# Patient Record
Sex: MALE | Race: BLACK OR AFRICAN AMERICAN | NOT HISPANIC OR LATINO | Employment: FULL TIME | URBAN - METROPOLITAN AREA
[De-identification: names, ages, dates, MRNs, and addresses within clinical notes are randomized per-mention and may not be internally consistent; named-entity substitution may affect disease eponyms.]

---

## 2017-07-10 ENCOUNTER — OFFICE VISIT (OUTPATIENT)
Dept: INTERNAL MEDICINE | Facility: CLINIC | Age: 42
End: 2017-07-10

## 2017-07-10 VITALS
TEMPERATURE: 96.6 F | BODY MASS INDEX: 39.55 KG/M2 | WEIGHT: 267 LBS | SYSTOLIC BLOOD PRESSURE: 120 MMHG | RESPIRATION RATE: 16 BRPM | HEIGHT: 69 IN | HEART RATE: 85 BPM | DIASTOLIC BLOOD PRESSURE: 80 MMHG | OXYGEN SATURATION: 98 %

## 2017-07-10 DIAGNOSIS — R73.02 IMPAIRED GLUCOSE TOLERANCE: ICD-10-CM

## 2017-07-10 DIAGNOSIS — E66.09 NON MORBID OBESITY DUE TO EXCESS CALORIES: ICD-10-CM

## 2017-07-10 DIAGNOSIS — E03.9 ACQUIRED HYPOTHYROIDISM: Primary | ICD-10-CM

## 2017-07-10 PROCEDURE — 99213 OFFICE O/P EST LOW 20 MIN: CPT | Performed by: INTERNAL MEDICINE

## 2017-07-10 NOTE — PROGRESS NOTES
Karsten Urias is a 41 y.o. male.   Visit date not found  Wt Readings from Last 1 Encounters:   07/10/17 267 lb (121 kg)   He was last seen November 2013, weight 231 then, now 267.    He returns for follow-up related to obesity, thyroid problems, impaired glucose tolerance    History of Present Illness   Patient has had problems with his weight chronically.  In April 2011 his weight was 273.  He lost weight down to 231 having just healthier diet and exercising regularly.  His getting back into the 280s and he is now involved in a weight reduction program with low calorie intake.  Laboratory studies done as a part of that recently showed TSH of 5.5 one, uric acid level of 8.9 and he had lipid study showing total cholesterol 193, triglyceride of 49 and .  He is known to have mild impaired glucose tolerance with previous hemoglobin A1c 5.7%.  There is family history of diabetes in his father and the paternal side of the family.  The following portions of the patient's history were reviewed and updated as appropriate: allergies, current medications, past family history, past medical history, past social history, past surgical history and problem list.    Review of Systems   Constitutional: Negative.    HENT: Negative.    Eyes: Negative.    Respiratory: Negative.    Cardiovascular: Negative.    Endocrine: Negative.    Genitourinary: Negative.    Skin: Negative.    Neurological: Negative.    Hematological: Negative.    Psychiatric/Behavioral: Negative.        Objective   Physical Exam   Constitutional: He appears well-developed and well-nourished.   HENT:   Head: Normocephalic and atraumatic.   Cardiovascular: Normal rate and regular rhythm.  Exam reveals no gallop and no friction rub.    No murmur heard.  Pulmonary/Chest: Effort normal and breath sounds normal. No respiratory distress. He has no wheezes. He has no rales.   Abdominal: Soft. Bowel sounds are normal. He exhibits no distension and no mass.  There is no tenderness.   Neurological: He is alert.   Skin: Skin is warm.   Psychiatric: His behavior is normal.   Vitals reviewed.      Assessment/Plan   Rivera was seen today for prediabetes and hypothyroidism.    Diagnoses and all orders for this visit:    Acquired hypothyroidism  Comments:  Suspect subclinical hypothyroidism, we'll check further thyroid levels for confirmation  Orders:  -     TSH  -     T4, Free    Impaired glucose tolerance  Comments:  We will check hemoglobin A1c level, encourage continued weight reduction  Orders:  -     Hemoglobin A1c    Non morbid obesity due to excess calories  Comments:  Encourage continued weight reduction and then consider transition into the [employer] program later                 Follow-up to be determined              EMR Dragon/Transcription disclaimer:      Much of this encounter note is an electronic transcription/translation of spoken language to printed text. The electronic translation of spoken language may permit erroneous, or at times, nonsensical words or phrases to be inadvertently transcribed; Although I have reviewed the note for such errors, some may still exist.

## 2017-07-11 LAB
HBA1C MFR BLD: 5.3 % (ref 4.8–5.6)
T4 FREE SERPL-MCNC: 1.15 NG/DL (ref 0.93–1.7)
TSH SERPL DL<=0.005 MIU/L-ACNC: 3.71 MIU/ML (ref 0.27–4.2)

## 2018-11-09 ENCOUNTER — OFFICE VISIT (OUTPATIENT)
Dept: INTERNAL MEDICINE | Facility: CLINIC | Age: 43
End: 2018-11-09

## 2018-11-09 VITALS
OXYGEN SATURATION: 94 % | SYSTOLIC BLOOD PRESSURE: 143 MMHG | DIASTOLIC BLOOD PRESSURE: 92 MMHG | TEMPERATURE: 98.7 F | BODY MASS INDEX: 38.42 KG/M2 | HEIGHT: 69 IN | WEIGHT: 259.4 LBS | RESPIRATION RATE: 16 BRPM | HEART RATE: 84 BPM

## 2018-11-09 DIAGNOSIS — I10 ESSENTIAL HYPERTENSION: Primary | ICD-10-CM

## 2018-11-09 PROCEDURE — 99213 OFFICE O/P EST LOW 20 MIN: CPT | Performed by: NURSE PRACTITIONER

## 2018-11-09 RX ORDER — AMLODIPINE BESYLATE 5 MG/1
2.5 TABLET ORAL DAILY
Qty: 35 TABLET | Refills: 0 | Status: SHIPPED | OUTPATIENT
Start: 2018-11-09 | End: 2018-12-26 | Stop reason: SDUPTHER

## 2018-11-09 NOTE — PROGRESS NOTES
Karsten Urias is a 43 y.o. male.   Chief Complaint   Patient presents with   • Headache   • Follow-up     BP       Patient presents for initial evaluation of elevated blood pressure readings.  He states that for the past month, he has been getting tension headaches off and on, more regularly than previous.  He states that he never truly monitored his blood pressure before this, but did take it 2 weeks ago, and got a reading in the upper 140s over upper 90s.  He did have an eye exam last week, and his blood pressure was in the upper 140s over 90s again.  He denies any visual changes, shortness of breath, chest pain.  He denies ever being on medication for hypertension before.  He presents to the office today with a blood pressure of 143/92.  He denies development of any other symptoms today.         The following portions of the patient's history were reviewed and updated as appropriate: allergies, current medications, past family history, past medical history, past social history, past surgical history and problem list.    Review of Systems   Constitutional: Negative for activity change, chills, fatigue, fever, unexpected weight gain and unexpected weight loss.   HENT: Negative for congestion, hearing loss, postnasal drip, sinus pressure, sneezing, sore throat and tinnitus.    Eyes: Negative for photophobia, pain and visual disturbance.   Respiratory: Negative for cough, chest tightness, shortness of breath and wheezing.    Cardiovascular: Negative for chest pain, palpitations and leg swelling.   Gastrointestinal: Negative for abdominal distention, abdominal pain, constipation, diarrhea, nausea and vomiting.   Endocrine: Negative for polydipsia, polyphagia and polyuria.   Genitourinary: Negative for dysuria, frequency, hematuria and urgency.   Neurological: Positive for headache (  Tension, intermittent). Negative for dizziness, weakness and numbness.   All other systems reviewed and are  "negative.      Objective    /92 (BP Location: Left arm, Patient Position: Sitting, Cuff Size: Large Adult)   Pulse 84   Temp 98.7 °F (37.1 °C) (Oral)   Resp 16   Ht 174 cm (68.5\")   Wt 118 kg (259 lb 6.4 oz)   SpO2 94%   BMI 38.87 kg/m²     Physical Exam   Constitutional: He is oriented to person, place, and time. He appears well-developed and well-nourished. No distress.   HENT:   Head: Normocephalic and atraumatic.   Right Ear: External ear normal.   Left Ear: External ear normal.   Nose: Nose normal.   Mouth/Throat: Oropharynx is clear and moist. No oropharyngeal exudate.   Eyes: Pupils are equal, round, and reactive to light. Conjunctivae and EOM are normal. Right eye exhibits no discharge. Left eye exhibits no discharge.   Neck: Normal range of motion. Neck supple. No JVD present. No tracheal deviation present. No thyromegaly present.   Cardiovascular: Normal rate, regular rhythm, normal heart sounds and intact distal pulses.  Exam reveals no gallop and no friction rub.    No murmur heard.  Pulmonary/Chest: Effort normal and breath sounds normal. No respiratory distress. He has no wheezes. He has no rales. He exhibits no tenderness.   Abdominal: Soft. Bowel sounds are normal. He exhibits no distension. There is no tenderness.   Musculoskeletal: Normal range of motion.   Lymphadenopathy:     He has no cervical adenopathy.   Neurological: He is alert and oriented to person, place, and time.   Skin: Skin is warm and dry. Capillary refill takes less than 2 seconds. He is not diaphoretic.   Psychiatric: He has a normal mood and affect. His behavior is normal.   Nursing note and vitals reviewed.        Assessment/Plan   Rivera was seen today for headache and follow-up.    Diagnoses and all orders for this visit:    Essential hypertension  -     amLODIPine (NORVASC) 5 MG tablet; Take 0.5 tablets by mouth Daily.      -Educated patient on hypertension and prevention of end organ damage.  Educated him on " Norvasc, which he is agreeable to starting at this time at 2.5 mg daily.  Ordered this medication.  Asked him to purchase an over-the-counter blood pressure monitor, and to record his readings in a log.  We spoke about possible side effects, and he states that he will call the office for development of side effects, high or low blood pressure readings.    -Discussed lifestyle modifications to further lower blood pressure, including diet and exercise.  He states that he will work on this as well.    -Follow-up in 1 month on blood pressure.

## 2018-12-04 ENCOUNTER — TELEPHONE (OUTPATIENT)
Dept: INTERNAL MEDICINE | Facility: CLINIC | Age: 43
End: 2018-12-04

## 2018-12-04 NOTE — TELEPHONE ENCOUNTER
Pt called about RX being on watch list for recall I advised he call his pharmacy and find out if his RX is one affected

## 2018-12-26 ENCOUNTER — OFFICE VISIT (OUTPATIENT)
Dept: INTERNAL MEDICINE | Facility: CLINIC | Age: 43
End: 2018-12-26

## 2018-12-26 VITALS
OXYGEN SATURATION: 99 % | HEIGHT: 69 IN | HEART RATE: 82 BPM | TEMPERATURE: 96.9 F | WEIGHT: 268 LBS | RESPIRATION RATE: 16 BRPM | SYSTOLIC BLOOD PRESSURE: 140 MMHG | BODY MASS INDEX: 39.69 KG/M2 | DIASTOLIC BLOOD PRESSURE: 92 MMHG

## 2018-12-26 DIAGNOSIS — E03.9 ACQUIRED HYPOTHYROIDISM: ICD-10-CM

## 2018-12-26 DIAGNOSIS — E66.01 CLASS 3 SEVERE OBESITY DUE TO EXCESS CALORIES WITH SERIOUS COMORBIDITY AND BODY MASS INDEX (BMI) OF 40.0 TO 44.9 IN ADULT (HCC): ICD-10-CM

## 2018-12-26 DIAGNOSIS — R73.02 IMPAIRED GLUCOSE TOLERANCE: ICD-10-CM

## 2018-12-26 DIAGNOSIS — I10 ESSENTIAL HYPERTENSION: ICD-10-CM

## 2018-12-26 DIAGNOSIS — I10 ESSENTIAL HYPERTENSION: Primary | ICD-10-CM

## 2018-12-26 LAB
ALBUMIN SERPL-MCNC: 4.4 G/DL (ref 3.5–5.2)
ALBUMIN/GLOB SERPL: 1.5 G/DL
ALP SERPL-CCNC: 67 U/L (ref 39–117)
ALT SERPL-CCNC: 20 U/L (ref 1–41)
APPEARANCE UR: CLEAR
AST SERPL-CCNC: 20 U/L (ref 1–40)
BACTERIA #/AREA URNS HPF: NORMAL /HPF
BILIRUB SERPL-MCNC: 0.2 MG/DL (ref 0.1–1.2)
BILIRUB UR QL STRIP: NEGATIVE
BUN SERPL-MCNC: 16 MG/DL (ref 6–20)
BUN/CREAT SERPL: 16.5 (ref 7–25)
CALCIUM SERPL-MCNC: 9.7 MG/DL (ref 8.6–10.5)
CASTS URNS MICRO: NORMAL
CHLORIDE SERPL-SCNC: 102 MMOL/L (ref 98–107)
CHOLEST SERPL-MCNC: 183 MG/DL (ref 0–200)
CO2 SERPL-SCNC: 27.5 MMOL/L (ref 22–29)
COLOR UR: YELLOW
CREAT SERPL-MCNC: 0.97 MG/DL (ref 0.76–1.27)
EPI CELLS #/AREA URNS HPF: NORMAL /HPF
GLOBULIN SER CALC-MCNC: 3 GM/DL
GLUCOSE SERPL-MCNC: 86 MG/DL (ref 65–99)
GLUCOSE UR QL: NEGATIVE
HBA1C MFR BLD: 5.33 % (ref 4.8–5.6)
HDLC SERPL-MCNC: 56 MG/DL (ref 40–60)
HGB UR QL STRIP: NEGATIVE
KETONES UR QL STRIP: NEGATIVE
LDLC SERPL CALC-MCNC: 112 MG/DL (ref 0–100)
LEUKOCYTE ESTERASE UR QL STRIP: NEGATIVE
NITRITE UR QL STRIP: NEGATIVE
PH UR STRIP: 6 [PH] (ref 5–8)
POTASSIUM SERPL-SCNC: 4.4 MMOL/L (ref 3.5–5.2)
PROT SERPL-MCNC: 7.4 G/DL (ref 6–8.5)
PROT UR QL STRIP: NEGATIVE
RBC #/AREA URNS HPF: NORMAL /HPF
SODIUM SERPL-SCNC: 142 MMOL/L (ref 136–145)
SP GR UR: 1.03 (ref 1–1.03)
T4 FREE SERPL-MCNC: 1.05 NG/DL (ref 0.93–1.7)
TRIGL SERPL-MCNC: 76 MG/DL (ref 0–150)
TSH SERPL DL<=0.005 MIU/L-ACNC: 2.58 MIU/ML (ref 0.27–4.2)
UROBILINOGEN UR STRIP-MCNC: (no result) MG/DL
VLDLC SERPL CALC-MCNC: 15.2 MG/DL (ref 5–40)
WBC #/AREA URNS HPF: NORMAL /HPF

## 2018-12-26 PROCEDURE — 99213 OFFICE O/P EST LOW 20 MIN: CPT | Performed by: INTERNAL MEDICINE

## 2018-12-26 RX ORDER — AMLODIPINE BESYLATE 5 MG/1
2.5 TABLET ORAL DAILY
Qty: 45 TABLET | Refills: 1 | Status: SHIPPED | OUTPATIENT
Start: 2018-12-26 | End: 2019-04-30 | Stop reason: SDUPTHER

## 2018-12-26 NOTE — PROGRESS NOTES
Karsten Urias is a 43 y.o. male.   Visit date not found  Wt Readings from Last 1 Encounters:   12/26/18 122 kg (268 lb)   He was last seen by me in July 2017 neck, weight 267 then, 268 now.  I    He returns for follow-up of hypertension and hypothyroidism    History of Present Illness   He is in the office for hypertension a few weeks ago and started on amlodipine 5 mg daily.  No medication problems described.  No history or symptoms of cardiac disease.    He had a slight abnormality TSH in the past but no rechecked July 2017.  Normal TSH and free T4.  He has not been on medication.  It would be a good idea to recheck the thyroid status    I he has obesity and body mass index is now greater than 40.  This is significant in association with hypertension.  He also has family history of diabetes in his father.  The following portions of the patient's history were reviewed and updated as appropriate: allergies, current medications, past family history, past medical history, past social history, past surgical history and problem list.    Review of Systems   Constitutional: Negative.    HENT: Negative.    Eyes: Negative.    Respiratory: Negative.    Cardiovascular: Negative.    Endocrine: Negative.    Genitourinary: Negative.    Skin: Negative.    Neurological: Negative.    Hematological: Negative.    Psychiatric/Behavioral: Negative.        Objective   Physical Exam   Constitutional: He appears well-developed and well-nourished.   HENT:   Head: Normocephalic and atraumatic.   Cardiovascular: Normal rate and regular rhythm. Exam reveals no gallop and no friction rub.   No murmur heard.  Blood pressure 120/86, left arm sitting, using large cuff   Pulmonary/Chest: Effort normal and breath sounds normal. No respiratory distress. He has no wheezes. He has no rales.   Abdominal: Soft. Bowel sounds are normal. He exhibits no distension and no mass. There is no tenderness.   Neurological: He is alert.   Skin: Skin is  warm.   Psychiatric: His behavior is normal.   Vitals reviewed.      Assessment/Plan   Rivera was seen today for hypertension and hypothyroidism.    Diagnoses and all orders for this visit:    Essential hypertension  Comments:  Continue amlodipine 5 mg daily, advise weight reduction and regular exercise  Orders:  -     Comprehensive Metabolic Panel  -     Lipid Panel  -     Urinalysis With Microscopic - Urine, Clean Catch  -     amLODIPine (NORVASC) 5 MG tablet; Take 0.5 tablets by mouth Daily.    Class 3 severe obesity due to excess calories with serious comorbidity and body mass index (BMI) of 40.0 to 44.9 in adult (CMS/[facility])  Comments:  Advise calorie restriction to reduce weight about 1 pound per week    Acquired hypothyroidism  Comments:  We will check thyroid levels  Orders:  -     TSH  -     T4, Free    Impaired glucose tolerance  Comments:  I will check hemoglobin A1c  Orders:  -     Hemoglobin A1c    Essential hypertension  -     Comprehensive Metabolic Panel  -     Lipid Panel  -     Urinalysis With Microscopic - Urine, Clean Catch  -     amLODIPine (NORVASC) 5 MG tablet; Take 0.5 tablets by mouth Daily.                               EMR Dragon/Transcription disclaimer:      Much of this encounter note is an electronic transcription/translation of spoken language to printed text. The electronic translation of spoken language may permit erroneous, or at times, nonsensical words or phrases to be inadvertently transcribed; Although I have reviewed the note for such errors, some may still exist.

## 2018-12-27 RX ORDER — SILDENAFIL 100 MG/1
TABLET, FILM COATED ORAL
Qty: 8 TABLET | Refills: 5
Start: 2018-12-27 | End: 2021-06-30 | Stop reason: SDUPTHER

## 2019-04-30 ENCOUNTER — OFFICE VISIT (OUTPATIENT)
Dept: INTERNAL MEDICINE | Facility: CLINIC | Age: 44
End: 2019-04-30

## 2019-04-30 VITALS
TEMPERATURE: 98.9 F | BODY MASS INDEX: 41.47 KG/M2 | HEART RATE: 73 BPM | OXYGEN SATURATION: 97 % | RESPIRATION RATE: 17 BRPM | WEIGHT: 280 LBS | DIASTOLIC BLOOD PRESSURE: 87 MMHG | HEIGHT: 69 IN | SYSTOLIC BLOOD PRESSURE: 141 MMHG

## 2019-04-30 DIAGNOSIS — J01.00 ACUTE MAXILLARY SINUSITIS, RECURRENCE NOT SPECIFIED: Primary | ICD-10-CM

## 2019-04-30 DIAGNOSIS — I10 ESSENTIAL HYPERTENSION: ICD-10-CM

## 2019-04-30 PROCEDURE — 99212 OFFICE O/P EST SF 10 MIN: CPT | Performed by: NURSE PRACTITIONER

## 2019-04-30 RX ORDER — FLUTICASONE PROPIONATE 50 MCG
2 SPRAY, SUSPENSION (ML) NASAL DAILY
Qty: 15.8 ML | Refills: 2 | Status: SHIPPED | OUTPATIENT
Start: 2019-04-30

## 2019-04-30 RX ORDER — AMOXICILLIN AND CLAVULANATE POTASSIUM 875; 125 MG/1; MG/1
1 TABLET, FILM COATED ORAL 2 TIMES DAILY
Qty: 14 TABLET | Refills: 0 | Status: SHIPPED | OUTPATIENT
Start: 2019-04-30 | End: 2019-05-07

## 2019-04-30 RX ORDER — AMLODIPINE BESYLATE 5 MG/1
2.5 TABLET ORAL DAILY
Qty: 45 TABLET | Refills: 3 | Status: SHIPPED | OUTPATIENT
Start: 2019-04-30 | End: 2021-06-30 | Stop reason: SDUPTHER

## 2019-04-30 NOTE — PROGRESS NOTES
"Subjective   Rivear Urias is a 43 y.o. male.   Chief Complaint   Patient presents with   • Sinus Problem     sinus pain, congestion, no sense of smell-started as a sore throat last thursday       Patient presents for evaluation of sinus pain.  This is a 43-year-old male patient formerly of Dr. Ramon.  He has a history of hypertension and hypothyroidism.  He endorses a one-week history of maxillary sinus pain and pressure, sore throat, bilateral plugged ear sensation, fatigue.  He denies shortness of breath, fever, chills, chest discomfort.  He has been taking \"everything over-the-counter\" and nothing has made a difference in his symptoms.  Treatments tried include Flonase, antihistamines, Mucinex, DayQuil.  He denies a cough.  He denies development of any other new issues today.         The following portions of the patient's history were reviewed and updated as appropriate: allergies, current medications, past family history, past medical history, past social history, past surgical history and problem list.    Review of Systems   Constitutional: Positive for fatigue. Negative for activity change, chills, fever, unexpected weight gain and unexpected weight loss.   HENT: Positive for congestion, ear pain, postnasal drip, rhinorrhea, sinus pressure, sneezing and sore throat. Negative for hearing loss and tinnitus.    Eyes: Negative for photophobia, pain and visual disturbance.   Respiratory: Negative for cough, chest tightness, shortness of breath and wheezing.    Cardiovascular: Negative for chest pain, palpitations and leg swelling.   Gastrointestinal: Negative for abdominal distention, abdominal pain, constipation, diarrhea, nausea and vomiting.   Endocrine: Negative for polydipsia, polyphagia and polyuria.   Genitourinary: Negative for dysuria, frequency, hematuria and urgency.   Neurological: Negative for dizziness, weakness, numbness and headache.   All other systems reviewed and are negative.      Objective  " "  /87 (BP Location: Left arm, Patient Position: Sitting, Cuff Size: Large Adult)   Pulse 73   Temp 98.9 °F (37.2 °C) (Oral)   Resp 17   Ht 174 cm (68.5\")   Wt 127 kg (280 lb)   SpO2 97%   BMI 41.95 kg/m²     Physical Exam   Constitutional: He is oriented to person, place, and time. He appears well-developed and well-nourished. He appears ill.   HENT:   Head: Normocephalic and atraumatic.   Right Ear: External ear normal. Tympanic membrane is erythematous. Tympanic membrane is not retracted and not bulging. A middle ear effusion is present.   Left Ear: External ear normal. Tympanic membrane is erythematous. Tympanic membrane is not retracted and not bulging. A middle ear effusion is present.   Nose: Mucosal edema, rhinorrhea and congestion present. Right sinus exhibits maxillary sinus tenderness. Right sinus exhibits no frontal sinus tenderness. Left sinus exhibits maxillary sinus tenderness. Left sinus exhibits no frontal sinus tenderness.   Mouth/Throat: Uvula is midline and mucous membranes are normal. Posterior oropharyngeal erythema present. No oropharyngeal exudate, posterior oropharyngeal edema or tonsillar abscesses.   Eyes: Conjunctivae and EOM are normal. Pupils are equal, round, and reactive to light. Right eye exhibits no discharge. Left eye exhibits no discharge.   Neck: Normal range of motion. Neck supple.   Cardiovascular: Normal rate, regular rhythm, normal heart sounds and intact distal pulses. Exam reveals no gallop and no friction rub.   No murmur heard.  Pulmonary/Chest: Effort normal and breath sounds normal. No stridor. No respiratory distress. He has no wheezes. He has no rales. He exhibits no tenderness.   Lungs are CTA bilaterally   Musculoskeletal: Normal range of motion.   Neurological: He is alert and oriented to person, place, and time.   Skin: Skin is warm and dry. Capillary refill takes less than 2 seconds. He is not diaphoretic.   Psychiatric: He has a normal mood and " affect. His behavior is normal. Judgment and thought content normal.   Nursing note and vitals reviewed.    Current outpatient and discharge medications have been reconciled for the patient.  Reviewed by: RENÉ Jackson      Assessment/Plan   Rivera was seen today for sinus problem.    Diagnoses and all orders for this visit:    Acute maxillary sinusitis, recurrence not specified  -     amoxicillin-clavulanate (AUGMENTIN) 875-125 MG per tablet; Take 1 tablet by mouth 2 (Two) Times a Day for 7 days.  -     fluticasone (FLONASE) 50 MCG/ACT nasal spray; 2 sprays into the nostril(s) as directed by provider Daily.    Essential hypertension  -     amLODIPine (NORVASC) 5 MG tablet; Take 0.5 tablets by mouth Daily.      -Maxillary sinusitis: We will treat with Augmentin 875-125 twice daily x7 days.  Also asked him to consistently use Flonase and antihistamine.  He may also use Mucinex and nasal saline solution for additional relief of congestion symptoms.    -Follow-up if symptoms persist or worsen.  Follow-up routinely with PCP.

## 2019-04-30 NOTE — PATIENT INSTRUCTIONS
Flonase (sent as prescription)   Mucinex  Increase water intake  Claritin or Zrtec (antihistamine)   Nasal saline solution   Probiotic while you're on the Augmentin

## 2021-03-10 ENCOUNTER — TELEPHONE (OUTPATIENT)
Dept: INTERNAL MEDICINE | Facility: CLINIC | Age: 46
End: 2021-03-10

## 2021-03-10 NOTE — TELEPHONE ENCOUNTER
Please call patient- I do not believe most sites are requiring written documentation, please inquire whether his specific site is requesting this. I have also not seem him in almost two years and wouldn't be able to provide an up to date and accurate BMI.

## 2021-03-10 NOTE — TELEPHONE ENCOUNTER
PATIENT IS REQUESTING DOCUMENTATION TO SUPPORT HIM GETTING THE COVID VACCINE IN PHASE 1C BASED ON HEALTH CONDITIONS: OBESITY    HE STATES THAT HE IS SCHEDULED FOR HIS FIRST DOSE TOMORROW    PLEASE ADVISE    PATIENT CAN BE REACHED AT  [phone]

## 2021-03-11 ENCOUNTER — CLINICAL SUPPORT (OUTPATIENT)
Dept: INTERNAL MEDICINE | Facility: CLINIC | Age: 46
End: 2021-03-11

## 2021-03-11 VITALS — BODY MASS INDEX: 43.15 KG/M2 | WEIGHT: 288 LBS

## 2021-03-11 NOTE — TELEPHONE ENCOUNTER
Patient is going to come in today to weight and have the weight an BMI documented, since I haven't seen him since April 2019. We will have him make an apt for a physical within 1-2 months as well. I will provide a note while he is here, based on the up to date weight and BMI. Let me know when he is here and I will provide that.

## 2021-03-11 NOTE — TELEPHONE ENCOUNTER
Pt is needing to get his covid vaccine due to death in the family. He is severely obese according to his BMI. States his weight today is 288. He is intending to get the vaccine today at a [facility] and they do require a written statement.     Please advise.

## 2021-03-17 ENCOUNTER — APPOINTMENT (OUTPATIENT)
Dept: VACCINE CLINIC | Facility: HOSPITAL | Age: 46
End: 2021-03-17

## 2021-06-10 ENCOUNTER — TELEPHONE (OUTPATIENT)
Dept: INTERNAL MEDICINE | Facility: CLINIC | Age: 46
End: 2021-06-10

## 2021-06-10 NOTE — TELEPHONE ENCOUNTER
Caller: Rivera Urias    Relationship: Self    Best call back number: [phone]    What medication are you requesting: SOMETHING FOR SINUS PRESSURE, SINUS DRAINAGE DOWN THROAT    What are your current symptoms: SINUS PRESSURE, SINUS DRAINAGE DOWN THROAT    How long have you been experiencing symptoms: THIS WEEK    Have you had these symptoms before:    [x] Yes  [] No    Have you been treated for these symptoms before:   [x] Yes  [] No    If a prescription is needed, what is your preferred pharmacy and phone number: [facility] [address] [place] [address] AT [facility] [address] - [phone]  - [phone] FX     Additional notes: PATIENT STATES HE HAS TRIED BEBO POTS, MUCINEX, VICKS INHALER VAPORIZER, SYMBALCAL, ZYRTEC AND NOTHING SEEMS TO TAKE THE PRESSURE AWAY.

## 2021-06-11 NOTE — TELEPHONE ENCOUNTER
I advise trying Dayquil short-term, but watch his blood pressure while taking it. Continue Mucinex and warm gargles. Continue an antihistamine, can try Xyzal instead of Zyrtec. Unfortunately I haven't seen patient since April 2019. I recommend apt for evaluation here or urgent care center.

## 2021-06-15 NOTE — TELEPHONE ENCOUNTER
Gordon'l call to reach patient. No v/m came on phone kept ringing.    I will send a message to patient via his my chart of Stephani's comments.

## 2021-06-23 ENCOUNTER — OFFICE VISIT (OUTPATIENT)
Dept: INTERNAL MEDICINE | Facility: CLINIC | Age: 46
End: 2021-06-23

## 2021-06-23 VITALS
HEART RATE: 100 BPM | HEIGHT: 69 IN | WEIGHT: 295 LBS | OXYGEN SATURATION: 97 % | DIASTOLIC BLOOD PRESSURE: 92 MMHG | SYSTOLIC BLOOD PRESSURE: 146 MMHG | TEMPERATURE: 97.7 F | BODY MASS INDEX: 43.69 KG/M2

## 2021-06-23 DIAGNOSIS — Z00.00 HEALTHCARE MAINTENANCE: ICD-10-CM

## 2021-06-23 DIAGNOSIS — Z12.5 SPECIAL SCREENING FOR MALIGNANT NEOPLASM OF PROSTATE: ICD-10-CM

## 2021-06-23 DIAGNOSIS — Z00.00 ANNUAL PHYSICAL EXAM: Primary | ICD-10-CM

## 2021-06-23 DIAGNOSIS — Z23 NEED FOR TDAP VACCINATION: ICD-10-CM

## 2021-06-23 DIAGNOSIS — K14.8 LESION OF TONGUE: ICD-10-CM

## 2021-06-23 DIAGNOSIS — B35.1 ONYCHOMYCOSIS: ICD-10-CM

## 2021-06-23 DIAGNOSIS — R73.02 IMPAIRED GLUCOSE TOLERANCE: ICD-10-CM

## 2021-06-23 DIAGNOSIS — I10 ESSENTIAL HYPERTENSION: ICD-10-CM

## 2021-06-23 PROBLEM — E66.09 OBESITY DUE TO EXCESS CALORIES: Chronic | Status: ACTIVE | Noted: 2017-07-10

## 2021-06-23 PROCEDURE — 90715 TDAP VACCINE 7 YRS/> IM: CPT | Performed by: NURSE PRACTITIONER

## 2021-06-23 PROCEDURE — 90471 IMMUNIZATION ADMIN: CPT | Performed by: NURSE PRACTITIONER

## 2021-06-23 PROCEDURE — 99396 PREV VISIT EST AGE 40-64: CPT | Performed by: NURSE PRACTITIONER

## 2021-06-23 NOTE — ASSESSMENT & PLAN NOTE
Patient's (Body mass index is 43.56 kg/m².)  Recommended 30 minutes/day at least 5 days/week of physical activity, decreasing carbohydrates, concentrated sweets, fats and processed foods in the diet.

## 2021-06-23 NOTE — ASSESSMENT & PLAN NOTE
Checking A1c today, will adjust therapy if needed.  Discussed increasing physical activity level, decreasing concentrated sweets, carbohydrates.

## 2021-06-23 NOTE — ASSESSMENT & PLAN NOTE
Routine dental and vision exams up-to-date.    Continue with regular exercise, goal of 30 minutes/day 5 days/week.    Updating Tdap today.    Up-to-date on COVID-19 vaccine.

## 2021-06-23 NOTE — ASSESSMENT & PLAN NOTE
Blood pressure is above goal today, discussed with patient goal of less than 130/80, DASH diet and increasing physical activity.  I have asked him to keep a blood pressure log for 2 weeks and send me readings via [employer].  If still persistently above goal I would like him to get back on the amlodipine.

## 2021-06-23 NOTE — PROGRESS NOTES
"Chief Complaint  Annual Exam    Subjective          Rivera Urias presents to [facility]  Patient presents for annual physical.  This is a 45-year-old male.    He has hypertension, has not been checking blood pressures at home routinely and has not been taking amlodipine 2.5 mg daily as previously prescribed.  Blood pressure is high today at 146/92.  No headaches, visual changes, shortness of breath or chest discomfort are reported.    He endorses onychomycosis of the bilateral great toes off and on for a year.  Over-the-counter medication has not helped.    He has a lesion to the left side of his tongue present for 2 weeks.  He saw his dentist last week and he has an appointment with an oral surgeon scheduled for 7/1/2021.  It is painful.    Reports that he is up-to-date on dental and vision exams.  Reports that his diet and exercise \"could you some changes.\"    He is a never smoker, does not drink alcohol regularly.    Due for Tdap update.  He has had both COVID-19 vaccines.    Denies development of any other new issues today.      Objective   Vital Signs:   /92 (BP Location: Left arm, Patient Position: Sitting, Cuff Size: Large Adult)   Pulse 100   Temp 97.7 °F (36.5 °C) (Tympanic)   Ht 175.3 cm (69\")   Wt 134 kg (295 lb)   SpO2 97%   BMI 43.56 kg/m²     Physical Exam  Vitals and nursing note reviewed.   Constitutional:       General: He is not in acute distress.     Appearance: Normal appearance. He is well-developed. He is obese. He is not ill-appearing, toxic-appearing or diaphoretic.   HENT:      Head: Normocephalic and atraumatic.      Right Ear: Tympanic membrane, ear canal and external ear normal.      Left Ear: Tympanic membrane, ear canal and external ear normal.   Eyes:      Pupils: Pupils are equal, round, and reactive to light.   Neck:      Vascular: No carotid bruit.   Cardiovascular:      Rate and Rhythm: Normal rate and regular rhythm.      Pulses: Normal " pulses.      Heart sounds: Normal heart sounds.      Comments: No peripheral edema  Pulmonary:      Effort: Pulmonary effort is normal. No respiratory distress.      Breath sounds: Normal breath sounds. No stridor. No wheezing, rhonchi or rales.   Chest:      Chest wall: No tenderness.   Abdominal:      General: Bowel sounds are normal. There is no distension.      Palpations: Abdomen is soft. There is no mass.      Tenderness: There is no abdominal tenderness. There is no right CVA tenderness, left CVA tenderness, guarding or rebound.      Hernia: No hernia is present.   Musculoskeletal:         General: Normal range of motion.      Cervical back: Normal range of motion and neck supple. No rigidity or tenderness.   Lymphadenopathy:      Cervical: No cervical adenopathy.   Skin:     General: Skin is warm and dry.      Capillary Refill: Capillary refill takes less than 2 seconds.   Neurological:      General: No focal deficit present.      Mental Status: He is alert and oriented to person, place, and time. Mental status is at baseline.   Psychiatric:         Mood and Affect: Mood normal.         Behavior: Behavior normal.         Thought Content: Thought content normal.         Judgment: Judgment normal.        Result Review :   The following data was reviewed by: RENÉ Perez on 06/23/2021:      Lab Results   Component Value Date    BUN 16 12/26/2018    CREATININE 0.97 12/26/2018    EGFRIFNONA 85 12/26/2018    EGFRIFAFRI 103 12/26/2018    BCR 16.5 12/26/2018    K 4.4 12/26/2018    CO2 27.5 12/26/2018    CALCIUM 9.7 12/26/2018    PROTENTOTREF 7.4 12/26/2018    ALBUMIN 4.40 12/26/2018    LABIL2 1.5 12/26/2018    AST 20 12/26/2018    ALT 20 12/26/2018         Current outpatient and discharge medications have been reconciled for the patient.  Reviewed by: RENÉ Perez       Assessment and Plan    Diagnoses and all orders for this visit:    1. Annual physical exam (Primary)    2. Healthcare  maintenance  Assessment & Plan:  Routine dental and vision exams up-to-date.    Continue with regular exercise, goal of 30 minutes/day 5 days/week.    Updating Tdap today.    Up-to-date on COVID-19 vaccine.    Orders:  -     Hepatitis C antibody    3. Impaired glucose tolerance  Assessment & Plan:  Checking A1c today, will adjust therapy if needed.  Discussed increasing physical activity level, decreasing concentrated sweets, carbohydrates.    Orders:  -     Hemoglobin A1c    4. Essential hypertension  Assessment & Plan:  Blood pressure is above goal today, discussed with patient goal of less than 130/80, DASH diet and increasing physical activity.  I have asked him to keep a blood pressure log for 2 weeks and send me readings via [email or web address].  If still persistently above goal I would like him to get back on the amlodipine.    Orders:  -     CBC No Differential  -     Comprehensive metabolic panel  -     Lipid panel  -     TSH Rfx On Abnormal To Free T4    5. Special screening for malignant neoplasm of prostate  Comments:  PSA checked today.  Orders:  -     PSA SCREENING    6. Need for Tdap vaccination  -     Tdap Vaccine Greater Than or Equal To 6yo IM    7. Lesion of tongue  Comments:  He is seeing his dentist and oral surgery next week.    8. Onychomycosis  Comments:  Likely will prescribe terbinafine, checking liver enzymes first today.  This has been an issue for about a year.  Over the counter has not worked.    We will contact patient with lab results and any further recommendations.  Follow-up as needed and I will see him back in 1 year for a physical.    Follow Up   Return in about 1 year (around 6/23/2022).  Patient was given instructions and counseling regarding his condition or for health maintenance advice. Please see specific information pulled into the AVS if appropriate.

## 2021-06-24 PROBLEM — R73.03 PREDIABETES: Status: ACTIVE | Noted: 2021-06-24

## 2021-06-24 LAB
ALBUMIN SERPL-MCNC: 4.7 G/DL (ref 3.5–5.2)
ALBUMIN/GLOB SERPL: 1.7 G/DL
ALP SERPL-CCNC: 82 U/L (ref 39–117)
ALT SERPL-CCNC: 29 U/L (ref 1–41)
AST SERPL-CCNC: 22 U/L (ref 1–40)
BILIRUB SERPL-MCNC: 0.3 MG/DL (ref 0–1.2)
BUN SERPL-MCNC: 13 MG/DL (ref 6–20)
BUN/CREAT SERPL: 13.5 (ref 7–25)
CALCIUM SERPL-MCNC: 10 MG/DL (ref 8.6–10.5)
CHLORIDE SERPL-SCNC: 101 MMOL/L (ref 98–107)
CHOLEST SERPL-MCNC: 196 MG/DL (ref 0–200)
CO2 SERPL-SCNC: 25.1 MMOL/L (ref 22–29)
CREAT SERPL-MCNC: 0.96 MG/DL (ref 0.76–1.27)
ERYTHROCYTE [DISTWIDTH] IN BLOOD BY AUTOMATED COUNT: 13.8 % (ref 12.3–15.4)
GLOBULIN SER CALC-MCNC: 2.7 GM/DL
GLUCOSE SERPL-MCNC: 105 MG/DL (ref 65–99)
HBA1C MFR BLD: 5.7 % (ref 4.8–5.6)
HCT VFR BLD AUTO: 46.1 % (ref 37.5–51)
HCV AB S/CO SERPL IA: <0.1 S/CO RATIO (ref 0–0.9)
HDLC SERPL-MCNC: 48 MG/DL (ref 40–60)
HGB BLD-MCNC: 15.3 G/DL (ref 13–17.7)
LDLC SERPL CALC-MCNC: 121 MG/DL (ref 0–100)
MCH RBC QN AUTO: 29.3 PG (ref 26.6–33)
MCHC RBC AUTO-ENTMCNC: 33.2 G/DL (ref 31.5–35.7)
MCV RBC AUTO: 88.1 FL (ref 79–97)
PLATELET # BLD AUTO: 266 10*3/MM3 (ref 140–450)
POTASSIUM SERPL-SCNC: 4.1 MMOL/L (ref 3.5–5.2)
PROT SERPL-MCNC: 7.4 G/DL (ref 6–8.5)
PSA SERPL-MCNC: 0.27 NG/ML (ref 0–4)
RBC # BLD AUTO: 5.23 10*6/MM3 (ref 4.14–5.8)
SODIUM SERPL-SCNC: 138 MMOL/L (ref 136–145)
TRIGL SERPL-MCNC: 151 MG/DL (ref 0–150)
TSH SERPL DL<=0.005 MIU/L-ACNC: 2.48 UIU/ML (ref 0.27–4.2)
VLDLC SERPL CALC-MCNC: 27 MG/DL (ref 5–40)
WBC # BLD AUTO: 8.57 10*3/MM3 (ref 3.4–10.8)

## 2021-06-24 NOTE — PROGRESS NOTES
Good morning Mr. Urias, your labs are back.  Blood count is normal showing normal white blood cells, platelets and hemoglobin indicating no anemia.  Metabolic panel looks stable including electrolytes, kidney function and liver enzymes.  Cholesterol panel showing normal total cholesterol, elevated triglycerides and LDL cholesterol, watch dietary intake of fats, carbs and sugars.  Thyroid numbers are normal.  A1c is in the prediabetic range at 5.7, again continue with watching the diet as far as carbohydrates and sugar intake and physical activity goal of 30 minutes/day 5 days/week.  Hepatitis C screening is normal.  PSA is normal.  Let me know if you have any questions and please send me your blood pressures in about 2 weeks from home.  Have a great day,RENÉ Perez

## 2021-06-30 DIAGNOSIS — B35.1 ONYCHOMYCOSIS OF TOENAIL: Primary | ICD-10-CM

## 2021-06-30 DIAGNOSIS — I10 ESSENTIAL HYPERTENSION: ICD-10-CM

## 2021-06-30 DIAGNOSIS — N52.9 ERECTILE DYSFUNCTION, UNSPECIFIED ERECTILE DYSFUNCTION TYPE: ICD-10-CM

## 2021-06-30 RX ORDER — SILDENAFIL 100 MG/1
TABLET, FILM COATED ORAL
Qty: 30 TABLET | Refills: 2
Start: 2021-06-30 | End: 2021-09-27 | Stop reason: SDUPTHER

## 2021-06-30 RX ORDER — AMLODIPINE BESYLATE 5 MG/1
5 TABLET ORAL DAILY
Qty: 90 TABLET | Refills: 1 | Status: SHIPPED | OUTPATIENT
Start: 2021-06-30 | End: 2021-09-27 | Stop reason: SDUPTHER

## 2021-06-30 RX ORDER — TERBINAFINE HYDROCHLORIDE 250 MG/1
TABLET ORAL
Qty: 90 TABLET | Refills: 0 | Status: SHIPPED | OUTPATIENT
Start: 2021-06-30

## 2021-09-15 ENCOUNTER — PATIENT MESSAGE (OUTPATIENT)
Dept: INTERNAL MEDICINE | Facility: CLINIC | Age: 46
End: 2021-09-15

## 2021-09-16 NOTE — TELEPHONE ENCOUNTER
From: Rivera Urias  To: RENÉ Perez  Sent: 9/15/2021 11:34 PM EDT  Subject: Non-Urgent Medical Question    Good evening. I am seeking assistance. I need medication consultation around and recent issue I am experiencing with some chest discomfort (and accompanying indigestion). I do not believe it is urgent/911, however for peace of mind, I would like assistance within the next 24-48 hours. I attempted to go in and schedule a visit for tomorrow, however there was no apparent availability. If possible, can someone help me either get an in-person appointment or scheduled e-visit.     Thanks in advance.     Rivera

## 2021-09-16 NOTE — TELEPHONE ENCOUNTER
To be safe, I think he should be evaluated emergently- I wouldn't mind seeing him at all, but I don't have the resources to be able to rule out an acute cardiac event, etc. He would need to be seen either in chest pain clinic or ER.

## 2021-09-27 DIAGNOSIS — I10 ESSENTIAL HYPERTENSION: ICD-10-CM

## 2021-09-27 DIAGNOSIS — N52.9 ERECTILE DYSFUNCTION, UNSPECIFIED ERECTILE DYSFUNCTION TYPE: ICD-10-CM

## 2021-09-27 RX ORDER — AMLODIPINE BESYLATE 5 MG/1
5 TABLET ORAL DAILY
Qty: 90 TABLET | Refills: 1 | Status: SHIPPED | OUTPATIENT
Start: 2021-09-27 | End: 2022-06-24

## 2021-09-27 RX ORDER — SILDENAFIL 100 MG/1
TABLET, FILM COATED ORAL
Qty: 30 TABLET | Refills: 2
Start: 2021-09-27 | End: 2021-09-28 | Stop reason: SDUPTHER

## 2021-09-27 NOTE — TELEPHONE ENCOUNTER
Caller: Rivera Urias    Relationship: Self      Medication requested (name and dosage): amLODIPine (NORVASC) 5 MG tablet    sildenafil (VIAGRA) 100 MG tablet    Pharmacy where request should be sent: [facility] [address] AT [facility] [address] - [phone]  - [phone] FX        Additional details provided by patient: PATIENT REQUESTING REFILL ON BOTH RX. PLEASE ADVISE THANK UO!    Best call back number:   [phone]    Does the patient have less than a 3 day supply:  [x] Yes  [] No    Alma Rosa Sánchez Rep   09/27/21 12:10 EDT

## 2021-09-28 DIAGNOSIS — N52.9 ERECTILE DYSFUNCTION, UNSPECIFIED ERECTILE DYSFUNCTION TYPE: ICD-10-CM

## 2021-09-28 RX ORDER — SILDENAFIL 100 MG/1
TABLET, FILM COATED ORAL
Qty: 30 TABLET | Refills: 2
Start: 2021-09-28 | End: 2022-06-29 | Stop reason: SDUPTHER

## 2021-09-28 NOTE — TELEPHONE ENCOUNTER
Caller: Rivera Urias    Relationship: Self      Medication requested (name and dosage): sildenafil (VIAGRA) 100 MG tablet    Pharmacy where request should be sent: [facility] [address] AT [facility] [address] - [phone] PH - [phone] FX  [phone]    Additional details provided by patient: PATIENT STATES WAS TOLD BY PHARMACY THAT THEY DO NOT HAVE A REFILL REQUEST ON FILE FOR MEDCIATION    Best call back number:[phone] (H)     Does the patient have less than a 3 day supply:  [x] Yes  [] No    Alma Rosa Summers Rep   09/28/21 12:12 EDT

## 2022-04-18 ENCOUNTER — TELEMEDICINE (OUTPATIENT)
Dept: INTERNAL MEDICINE | Facility: CLINIC | Age: 47
End: 2022-04-18

## 2022-04-18 DIAGNOSIS — R06.83 SNORING: Primary | ICD-10-CM

## 2022-04-18 DIAGNOSIS — E66.01 CLASS 3 SEVERE OBESITY DUE TO EXCESS CALORIES WITH SERIOUS COMORBIDITY AND BODY MASS INDEX (BMI) OF 40.0 TO 44.9 IN ADULT: Chronic | ICD-10-CM

## 2022-04-18 PROCEDURE — 99213 OFFICE O/P EST LOW 20 MIN: CPT | Performed by: NURSE PRACTITIONER

## 2022-04-18 NOTE — PROGRESS NOTES
Chief Complaint  Follow up    Subjective          Rivera Urias presents to [facility]  History of Present Illness  You have chosen to receive care through a telehealth visit.  Do you consent to use a video/audio connection for your medical care today? Yes    Patient is a pleasant 46-year-old male who typically sees RENÉ Perez here in the office.  Patient is new to me and is doing a video visit today for a follow-up visit regarding snoring/possible sleep apnea.  Patient states at this time he feels he wants to be seen by a sleep medicine specialist.  He has a hx of obesity and snoring.   Patient denies any chest pain/pressure or SOB on today's office visit.     Objective   Vital Signs:   There were no vitals taken for this visit.    BMI has not been calculated during today's encounter.       Physical Exam  Constitutional:       Appearance: He is obese.   Pulmonary:      Comments: Denies SOB and he c/o snoring.   Neurological:      Mental Status: He is alert and oriented to person, place, and time.   Psychiatric:         Behavior: Behavior normal.        Result Review :            Office Visit with Stephani Hernandez APRN (06/23/2021)  CBC No Differential (06/23/2021 4:03 PM)  Comprehensive metabolic panel (06/23/2021 4:03 PM)       Assessment and Plan    Diagnoses and all orders for this visit:    1. Snoring (Primary)  -     Ambulatory Referral to Sleep Medicine    2. Class 3 severe obesity due to excess calories with serious comorbidity and body mass index (BMI) of 40.0 to 44.9 in adult (HCC)  -     Ambulatory Referral to Sleep Medicine      An ambulatory referral was placed for sleep medicine due to his snoring and obesity and the request at this time.  Patient will continue to monitor his blood pressure at home and take his hypertensive medication.  Patient will follow up in the office as needed.  Patient agrees with this treatment plan at this time.      Follow Up   Return  if symptoms worsen or fail to improve.  Patient was given instructions and counseling regarding his condition or for health maintenance advice. Please see specific information pulled into the AVS if appropriate.

## 2022-05-12 ENCOUNTER — OFFICE VISIT (OUTPATIENT)
Dept: SLEEP MEDICINE | Facility: HOSPITAL | Age: 47
End: 2022-05-12

## 2022-05-12 VITALS — WEIGHT: 308 LBS | HEIGHT: 69 IN | OXYGEN SATURATION: 97 % | BODY MASS INDEX: 45.62 KG/M2 | HEART RATE: 82 BPM

## 2022-05-12 DIAGNOSIS — G47.30 HYPERSOMNIA WITH SLEEP APNEA: Primary | ICD-10-CM

## 2022-05-12 DIAGNOSIS — E66.01 CLASS 3 SEVERE OBESITY DUE TO EXCESS CALORIES WITH SERIOUS COMORBIDITY AND BODY MASS INDEX (BMI) OF 45.0 TO 49.9 IN ADULT: ICD-10-CM

## 2022-05-12 DIAGNOSIS — R06.83 SNORING: ICD-10-CM

## 2022-05-12 DIAGNOSIS — G47.10 HYPERSOMNIA WITH SLEEP APNEA: Primary | ICD-10-CM

## 2022-05-12 PROCEDURE — G0463 HOSPITAL OUTPT CLINIC VISIT: HCPCS

## 2022-05-12 PROCEDURE — 99204 OFFICE O/P NEW MOD 45 MIN: CPT | Performed by: INTERNAL MEDICINE

## 2022-05-12 NOTE — PROGRESS NOTES
"[facility] New Patient/Consultation       Reason for Consultation: ERICH    Patient Care Team:  Stephani Hernandez APRN as PCP - General (Nurse Practitioner)  Binh Unger MD as Consulting Physician (Sleep Medicine)    Chief complaint: Snoring and sleepy    History of present illness:    Thank you for asking me to see your patient.  The patient is a 46 y.o. male who reports for as long as he can remember, sleep and snoring has always been a challenge.  Presently, he sleeps in a separate bedroom.  The patient goes to bed at midnight and awakens at 5:45 AM.  He falls asleep instantly.  How he feels upon awakening, \"depends\".  The patient has complaints of hypersomnolence and his [place] Sleepiness Scale is borderline abnormal at 8.  The patient has gained 40 pounds in the last several years.  Besides snoring, witnessed apnea also noted.  He does awaken with a dry mouth.  He does not use the restroom during the nighttime.    Review of Systems:    A complete review of systems was done and all were negative with the exception of the above    History:  Past Medical History:   Diagnosis Date   • Hypertension    , History reviewed. No pertinent surgical history.,   Family History   Problem Relation Age of Onset   • Obesity Other    • Sleep apnea Other    • Thyroid disease Other    • Hypertension Other     and   Social History     Socioeconomic History   • Marital status:    Tobacco Use   • Smoking status: Never Smoker   • Smokeless tobacco: Never Used   Substance and Sexual Activity   • Alcohol use: Never   • Drug use: Never     E-cigarette/Vaping     E-cigarette/Vaping Substances     E-cigarette/Vaping Devices        Social History: Patient works human resources.  1 caffeinated beverage a day.    Allergies:  Patient has no known allergies.     Medication Review: Amlodipine, Flonase, Xyzal    Vital Signs:    Vitals:    05/12/22 1025   Pulse: 82   SpO2: 97%   Weight: (!) 140 kg (308 lb)   Height: 175.3 " "cm (69\")      Body mass index is 45.48 kg/m².  Neck Circumference: 17 inches      Physical Exam:    Constitutional:  Well developed 46 y.o. male that appears in no apparent distress.  Awake & oriented times 3.  Normal mood with normal recent and remote memory and normal judgement.  Eyes:  Conjunctivae normal.  Oropharynx: Moist mucous membranes without exudate and a large tongue and class III Mallampati airway.  Patient wearing a facemask.  Neck: Trachea midline  Respiratory: Effort is not labored  Cardiovascular: Radial pulse regular  Musculoskeletal: Gait appears normal, no digital clubbing evident, trace-1+ pre-tibial edema    Impression:   The patient has complaints of snoring with witnessed apnea consistent with sleep disordered breathing.  Additionally, the patient has complaints of hypersomnolence with an abnormal [place] Sleepiness Scale of 8.  Comorbidities include hypertension.  The patient has a high pretest probability of having obstructive sleep apnea.    Plan:  Good sleep hygiene measures should be maintained.  Weight loss would be beneficial in this patient who has class III severe obesity (Body mass index is 45.48 kg/m².)     Pathophysiology of ERICH described to the patient.  Cardiovascular complications of untreated ERICH also reviewed.  I also reviewed the relationship of untreated ERICH with hypertension.    After reviewing all with the patient, I would recommend and he is agreeable to proceed with a home sleep study.  I answered all of his questions.  Home sleep study will be scheduled.  Further recommendations will be made once results of the home sleep study are known.    Thank you for requesting me to assist in this patient's care.    Binh Unger MD  Sleep Medicine  05/19/22  07:37 EDT          "

## 2022-05-19 PROBLEM — E66.813 CLASS 3 SEVERE OBESITY DUE TO EXCESS CALORIES WITH SERIOUS COMORBIDITY AND BODY MASS INDEX (BMI) OF 45.0 TO 49.9 IN ADULT: Status: ACTIVE | Noted: 2017-07-10

## 2022-05-19 PROBLEM — E66.01 CLASS 3 SEVERE OBESITY DUE TO EXCESS CALORIES WITH SERIOUS COMORBIDITY AND BODY MASS INDEX (BMI) OF 45.0 TO 49.9 IN ADULT (HCC): Status: ACTIVE | Noted: 2017-07-10

## 2022-05-19 PROBLEM — G47.10 HYPERSOMNIA WITH SLEEP APNEA: Status: ACTIVE | Noted: 2022-05-19

## 2022-05-19 PROBLEM — G47.30 HYPERSOMNIA WITH SLEEP APNEA: Status: ACTIVE | Noted: 2022-05-19

## 2022-05-27 ENCOUNTER — HOSPITAL ENCOUNTER (OUTPATIENT)
Dept: SLEEP MEDICINE | Facility: HOSPITAL | Age: 47
Discharge: HOME | End: 2022-05-27
Admitting: INTERNAL MEDICINE

## 2022-05-27 DIAGNOSIS — G47.30 HYPERSOMNIA WITH SLEEP APNEA: ICD-10-CM

## 2022-05-27 DIAGNOSIS — G47.10 HYPERSOMNIA WITH SLEEP APNEA: ICD-10-CM

## 2022-05-27 PROCEDURE — 95806 SLEEP STUDY UNATT&RESP EFFT: CPT

## 2022-05-29 PROCEDURE — 95806 SLEEP STUDY UNATT&RESP EFFT: CPT | Performed by: INTERNAL MEDICINE

## 2022-06-13 ENCOUNTER — TELEPHONE (OUTPATIENT)
Dept: SLEEP MEDICINE | Facility: HOSPITAL | Age: 47
End: 2022-06-13

## 2022-06-13 NOTE — TELEPHONE ENCOUNTER
Spoke with patient about results , sending orders to [facility] , patient will call once set up on CPAP

## 2022-06-24 DIAGNOSIS — I10 ESSENTIAL HYPERTENSION: ICD-10-CM

## 2022-06-24 RX ORDER — AMLODIPINE BESYLATE 5 MG/1
TABLET ORAL
Qty: 90 TABLET | Refills: 1 | Status: SHIPPED | OUTPATIENT
Start: 2022-06-24 | End: 2022-12-27 | Stop reason: SDUPTHER

## 2022-06-29 ENCOUNTER — OFFICE VISIT (OUTPATIENT)
Dept: INTERNAL MEDICINE | Facility: CLINIC | Age: 47
End: 2022-06-29

## 2022-06-29 VITALS
DIASTOLIC BLOOD PRESSURE: 93 MMHG | OXYGEN SATURATION: 97 % | WEIGHT: 310 LBS | HEART RATE: 83 BPM | TEMPERATURE: 98.3 F | HEIGHT: 69 IN | RESPIRATION RATE: 18 BRPM | SYSTOLIC BLOOD PRESSURE: 146 MMHG | BODY MASS INDEX: 45.91 KG/M2

## 2022-06-29 DIAGNOSIS — Z12.11 COLON CANCER SCREENING: ICD-10-CM

## 2022-06-29 DIAGNOSIS — N52.9 ERECTILE DYSFUNCTION, UNSPECIFIED ERECTILE DYSFUNCTION TYPE: ICD-10-CM

## 2022-06-29 DIAGNOSIS — Z00.00 HEALTHCARE MAINTENANCE: Chronic | ICD-10-CM

## 2022-06-29 DIAGNOSIS — E66.01 CLASS 3 SEVERE OBESITY DUE TO EXCESS CALORIES WITH SERIOUS COMORBIDITY AND BODY MASS INDEX (BMI) OF 45.0 TO 49.9 IN ADULT: Chronic | ICD-10-CM

## 2022-06-29 DIAGNOSIS — Z00.00 ANNUAL PHYSICAL EXAM: Primary | ICD-10-CM

## 2022-06-29 DIAGNOSIS — R73.03 PREDIABETES: ICD-10-CM

## 2022-06-29 DIAGNOSIS — Z12.5 PROSTATE CANCER SCREENING: ICD-10-CM

## 2022-06-29 DIAGNOSIS — I10 ESSENTIAL HYPERTENSION: ICD-10-CM

## 2022-06-29 PROBLEM — E66.813 CLASS 3 SEVERE OBESITY DUE TO EXCESS CALORIES WITH SERIOUS COMORBIDITY AND BODY MASS INDEX (BMI) OF 45.0 TO 49.9 IN ADULT: Chronic | Status: ACTIVE | Noted: 2017-07-10

## 2022-06-29 PROCEDURE — 99396 PREV VISIT EST AGE 40-64: CPT | Performed by: NURSE PRACTITIONER

## 2022-06-29 RX ORDER — SILDENAFIL 100 MG/1
TABLET, FILM COATED ORAL
Qty: 30 TABLET | Refills: 2 | Status: SHIPPED | OUTPATIENT
Start: 2022-06-29

## 2022-06-29 NOTE — ASSESSMENT & PLAN NOTE
Blood pressure is elevated today.  He is going to take his home blood pressures daily for the next month, watch sodium intake/DASH diet.  We will have a video visit in 1 month to go over his home blood pressure readings and if consistently above goal of 140/80 we can change his medications.  Continue amlodipine 5 mg daily.

## 2022-06-29 NOTE — ASSESSMENT & PLAN NOTE
Patient's (Body mass index is 45.78 kg/m².)  Worsening compared to last documented weight.  We discussed exercise goal, 30 minutes/day 4 to 5 days/week along with well-balanced diet.

## 2022-06-29 NOTE — PROGRESS NOTES
"Chief Complaint  Annual Exam (Pt presents here today for his annual physical.)    Subjective        Rivera Urias presents to [facility]  Patient presents for an annual physical.  This is a 46-year-old male.    He has a history of prediabetes, last A1c in June 2021 was 5.7.    He has erectile dysfunction and takes Viagra as needed.  He would like a referral to urology for further evaluation.    He has ERICH and follows with Dr. Unger.  He is in the process of getting a CPAP machine.    Weight is currently 310 pounds with a BMI of 45.8.    He has hypertension, has not been checking his blood pressures routinely at home.  He takes amlodipine 5 mg daily with good compliance.    Endorses being up-to-date on dental and vision exams.    He does not drink alcohol regularly and is a never smoker.    Otherwise denies development of other new issues today.      Objective   Vital Signs:  /93 (BP Location: Left arm, Patient Position: Sitting, Cuff Size: Large Adult)   Pulse 83   Temp 98.3 °F (36.8 °C)   Resp 18   Ht 175.3 cm (69\")   Wt (!) 141 kg (310 lb)   SpO2 97%   BMI 45.78 kg/m²   Estimated body mass index is 45.78 kg/m² as calculated from the following:    Height as of this encounter: 175.3 cm (69\").    Weight as of this encounter: 141 kg (310 lb).          Physical Exam  Vitals and nursing note reviewed.   Constitutional:       General: He is not in acute distress.     Appearance: Normal appearance. He is well-developed. He is obese. He is not ill-appearing, toxic-appearing or diaphoretic.   HENT:      Head: Normocephalic and atraumatic.      Right Ear: Tympanic membrane, ear canal and external ear normal.      Left Ear: Tympanic membrane, ear canal and external ear normal.   Eyes:      Pupils: Pupils are equal, round, and reactive to light.   Neck:      Vascular: No carotid bruit.   Cardiovascular:      Rate and Rhythm: Normal rate and regular rhythm.      Pulses: Normal " pulses.      Heart sounds: Normal heart sounds.      Comments: No peripheral edema  Pulmonary:      Effort: Pulmonary effort is normal. No respiratory distress.      Breath sounds: Normal breath sounds. No stridor. No wheezing, rhonchi or rales.   Chest:      Chest wall: No tenderness.   Abdominal:      General: Bowel sounds are normal. There is no distension.      Palpations: Abdomen is soft. There is no mass.      Tenderness: There is no abdominal tenderness. There is no right CVA tenderness, left CVA tenderness, guarding or rebound.      Hernia: No hernia is present.   Musculoskeletal:         General: Normal range of motion.      Cervical back: Normal range of motion and neck supple. No rigidity or tenderness.   Lymphadenopathy:      Cervical: No cervical adenopathy.   Skin:     General: Skin is warm and dry.      Capillary Refill: Capillary refill takes less than 2 seconds.   Neurological:      General: No focal deficit present.      Mental Status: He is alert and oriented to person, place, and time. Mental status is at baseline.   Psychiatric:         Mood and Affect: Mood normal.         Behavior: Behavior normal.         Thought Content: Thought content normal.         Judgment: Judgment normal.        Result Review :  The following data was reviewed by: RENÉ Perez on 06/29/2022:      Current outpatient and discharge medications have been reconciled for the patient.  Reviewed by: RENÉ Perez    Lab Results   Component Value Date    GLUCOSE 105 (H) 06/23/2021    BUN 13 06/23/2021    CREATININE 0.96 06/23/2021    EGFRIFNONA 85 06/23/2021    EGFRIFAFRI 103 06/23/2021    BCR 13.5 06/23/2021    K 4.1 06/23/2021    CO2 25.1 06/23/2021    CALCIUM 10.0 06/23/2021    PROTENTOTREF 7.4 06/23/2021    ALBUMIN 4.70 06/23/2021    LABIL2 1.7 06/23/2021    AST 22 06/23/2021    ALT 29 06/23/2021              Assessment and Plan   Diagnoses and all orders for this visit:    1. Annual physical exam  (Primary)    2. Colon cancer screening  -     Ambulatory Referral For Screening Colonoscopy    3. Essential hypertension  Assessment & Plan:  Blood pressure is elevated today.  He is going to take his home blood pressures daily for the next month, watch sodium intake/DASH diet.  We will have a video visit in 1 month to go over his home blood pressure readings and if consistently above goal of 140/80 we can change his medications.  Continue amlodipine 5 mg daily.    Orders:  -     CBC No Differential; Future  -     Comprehensive metabolic panel; Future  -     Lipid panel; Future  -     TSH Rfx On Abnormal To Free T4; Future    4. Prediabetes  Assessment & Plan:  Continue well-balanced diet, discussed lowering intake of carbs, sugars.  A1c with labs and we will adjust therapy if needed.    Orders:  -     Hemoglobin A1c; Future    5. Erectile dysfunction, unspecified erectile dysfunction type  Comments:  Urology referral is placed per patient's request.  Refilled Viagra.  Orders:  -     Ambulatory Referral to Urology  -     sildenafil (VIAGRA) 100 MG tablet; 1/2-1 tab po 30-60 min before intercourse  Dispense: 30 tablet; Refill: 2    6. Prostate cancer screening  Comments:  PSA ordered.  Orders:  -     PSA SCREENING; Future    7. Class 3 severe obesity due to excess calories with serious comorbidity and body mass index (BMI) of 45.0 to 49.9 in adult (HCC)  Assessment & Plan:  Patient's (Body mass index is 45.78 kg/m².)  Worsening compared to last documented weight.  We discussed exercise goal, 30 minutes/day 4 to 5 days/week along with well-balanced diet.      8. Healthcare maintenance  Assessment & Plan:  Screening colonoscopy is due, discussed with patient and this is ordered.    Up-to-date on dental and vision exams.    Discussed the importance of regular testicular self exams monthly.           We will contact patient with lab results and any further recommendations.  Follow-up as needed and I will see him back in  1 month for recheck of hypertension/possible titration of antihypertensive medications.    Follow Up   Return in about 1 month (around 7/29/2022).  Patient was given instructions and counseling regarding his condition or for health maintenance advice. Please see specific information pulled into the AVS if appropriate.

## 2022-06-29 NOTE — ASSESSMENT & PLAN NOTE
Screening colonoscopy is due, discussed with patient and this is ordered.    Up-to-date on dental and vision exams.    Discussed the importance of regular testicular self exams monthly.

## 2022-06-29 NOTE — ASSESSMENT & PLAN NOTE
Continue well-balanced diet, discussed lowering intake of carbs, sugars.  A1c with labs and we will adjust therapy if needed.

## 2022-07-01 DIAGNOSIS — Z12.5 PROSTATE CANCER SCREENING: ICD-10-CM

## 2022-07-01 DIAGNOSIS — I10 ESSENTIAL HYPERTENSION: ICD-10-CM

## 2022-07-01 DIAGNOSIS — R73.03 PREDIABETES: ICD-10-CM

## 2022-07-05 ENCOUNTER — PRE-PROCEDURE SCREENING (OUTPATIENT)
Dept: GASTROENTEROLOGY | Facility: CLINIC | Age: 47
End: 2022-07-05

## 2022-07-06 LAB
ALBUMIN SERPL-MCNC: 4.3 G/DL (ref 4–5)
ALBUMIN/GLOB SERPL: 1.3 {RATIO} (ref 1.2–2.2)
ALP SERPL-CCNC: 90 IU/L (ref 44–121)
ALT SERPL-CCNC: 41 IU/L (ref 0–44)
AST SERPL-CCNC: 30 IU/L (ref 0–40)
BILIRUB SERPL-MCNC: 0.2 MG/DL (ref 0–1.2)
BUN SERPL-MCNC: 15 MG/DL (ref 6–24)
BUN/CREAT SERPL: 13 (ref 9–20)
CALCIUM SERPL-MCNC: 9.4 MG/DL (ref 8.7–10.2)
CHLORIDE SERPL-SCNC: 100 MMOL/L (ref 96–106)
CHOLEST SERPL-MCNC: 202 MG/DL (ref 100–199)
CO2 SERPL-SCNC: 23 MMOL/L (ref 20–29)
CREAT SERPL-MCNC: 1.13 MG/DL (ref 0.76–1.27)
EGFRCR SERPLBLD CKD-EPI 2021: 82 ML/MIN/1.73
ERYTHROCYTE [DISTWIDTH] IN BLOOD BY AUTOMATED COUNT: 13.3 % (ref 11.6–15.4)
GLOBULIN SER CALC-MCNC: 3.2 G/DL (ref 1.5–4.5)
GLUCOSE SERPL-MCNC: 100 MG/DL (ref 65–99)
HBA1C MFR BLD: 5.8 % (ref 4.8–5.6)
HCT VFR BLD AUTO: 46.5 % (ref 37.5–51)
HDLC SERPL-MCNC: 48 MG/DL
HGB BLD-MCNC: 15.3 G/DL (ref 13–17.7)
LDLC SERPL CALC-MCNC: 123 MG/DL (ref 0–99)
MCH RBC QN AUTO: 28.7 PG (ref 26.6–33)
MCHC RBC AUTO-ENTMCNC: 32.9 G/DL (ref 31.5–35.7)
MCV RBC AUTO: 87 FL (ref 79–97)
PLATELET # BLD AUTO: 234 X10E3/UL (ref 150–450)
POTASSIUM SERPL-SCNC: 4.4 MMOL/L (ref 3.5–5.2)
PROT SERPL-MCNC: 7.5 G/DL (ref 6–8.5)
PSA SERPL-MCNC: 0.3 NG/ML (ref 0–4)
RBC # BLD AUTO: 5.33 X10E6/UL (ref 4.14–5.8)
SODIUM SERPL-SCNC: 138 MMOL/L (ref 134–144)
T4 FREE SERPL-MCNC: 0.95 NG/DL (ref 0.82–1.77)
TRIGL SERPL-MCNC: 173 MG/DL (ref 0–149)
TSH SERPL DL<=0.005 MIU/L-ACNC: 6.69 UIU/ML (ref 0.45–4.5)
VLDLC SERPL CALC-MCNC: 31 MG/DL (ref 5–40)
WBC # BLD AUTO: 8.8 X10E3/UL (ref 3.4–10.8)

## 2022-07-08 DIAGNOSIS — R79.89 ELEVATED TSH: Primary | ICD-10-CM

## 2022-07-08 NOTE — PROGRESS NOTES
Good morning Mr. Urias, labs are back.  PSA is normal.  A1c is elevated to slightly into the prediabetic range, slightly higher than last check at 5.8.  As discussed please engage in regular physical activity and watch intake of carbs and sugars in the diet to help prevent progression of the A1c to true diabetic range.  Triglycerides are elevated, total cholesterol is mildly elevated as well as LDL cholesterol.  Again please watch intake of fats in the diet along with regular exercise and this will help to bring these numbers down.  Metabolic panel is stable including kidney function, electrolytes and liver enzymes.  Blood count looks great with no anemia, normal white blood cells and platelets.  Your TSH is a bit elevated possibly indicating that you are not getting quite enough thyroid hormone.  Looking back over your labs from the past 4 years the TSH has been normal, therefore I would like to recheck once more before initiating thyroid replacement medication.  I would like to recheck the thyroid numbers in 2 months and I will have the office call you to schedule a nonfasting lab appointment.  Let me know if you have any questions in the meantime and have a great day,    RENÉ Perez

## 2022-07-08 NOTE — PROGRESS NOTES
I sent the patient a [employer] message explaining his lab results but please give him a call to get him scheduled for a 2-month nonfasting lab appointment for recheck of thyroid numbers which I ordered.

## 2022-07-11 ENCOUNTER — TELEPHONE (OUTPATIENT)
Dept: INTERNAL MEDICINE | Facility: CLINIC | Age: 47
End: 2022-07-11

## 2022-07-11 NOTE — TELEPHONE ENCOUNTER
----- Message from RENÉ Perez sent at 7/8/2022  8:42 AM EDT -----  I sent the patient a [employer] message explaining his lab results but please give him a call to get him scheduled for a 2-month nonfasting lab appointment for recheck of thyroid numbers which I ordered.

## 2022-12-27 DIAGNOSIS — I10 ESSENTIAL HYPERTENSION: ICD-10-CM

## 2022-12-27 RX ORDER — AMLODIPINE BESYLATE 5 MG/1
5 TABLET ORAL DAILY
Qty: 30 TABLET | Refills: 0 | Status: SHIPPED | OUTPATIENT
Start: 2022-12-27 | End: 2023-01-24

## 2022-12-27 NOTE — TELEPHONE ENCOUNTER
Rx Refill Note  Requested Prescriptions     Pending Prescriptions Disp Refills   • amLODIPine (NORVASC) 5 MG tablet 90 tablet 1     Sig: Take 1 tablet by mouth Daily.      Last office visit with clinician: 6/29/22    Next office visit with prescribing clinician: 1/19/2023   {   within functional limits

## 2022-12-27 NOTE — TELEPHONE ENCOUNTER
Caller: Rivera Urias    Relationship: Self    Best call back number:[phone]    Requested Prescriptions:   Requested Prescriptions     Pending Prescriptions Disp Refills   • amLODIPine (NORVASC) 5 MG tablet 90 tablet 1     Sig: Take 1 tablet by mouth Daily.        Pharmacy where request should be sent: [facility] [id number] [facility] [id number] [facility] AT [facility] & [place] - [phone]  - [phone] FX     Additional details provided by patient: OUT OF MEDICATION. SET UP APPT FOR FIRST AVAILABLE WITH RENÉ ARAIZA 1/19. PLEASE ADVISE.     Does the patient have less than a 3 day supply:  [x] Yes  [] No    Would you like a call back once the refill request has been completed: [x] Yes [] No    If the office needs to give you a call back, can they leave a voicemail: [x] Yes [] No    Alma Rosa Farmer Rep   12/27/22 10:51 EST

## 2023-01-19 ENCOUNTER — TELEPHONE (OUTPATIENT)
Dept: INTERNAL MEDICINE | Facility: CLINIC | Age: 48
End: 2023-01-19

## 2023-01-19 ENCOUNTER — OFFICE VISIT (OUTPATIENT)
Dept: INTERNAL MEDICINE | Facility: CLINIC | Age: 48
End: 2023-01-19
Payer: COMMERCIAL

## 2023-01-19 VITALS
DIASTOLIC BLOOD PRESSURE: 96 MMHG | TEMPERATURE: 97.8 F | SYSTOLIC BLOOD PRESSURE: 142 MMHG | BODY MASS INDEX: 44.88 KG/M2 | WEIGHT: 303 LBS | OXYGEN SATURATION: 97 % | HEART RATE: 79 BPM | HEIGHT: 69 IN

## 2023-01-19 DIAGNOSIS — R07.89 CHEST DISCOMFORT: ICD-10-CM

## 2023-01-19 DIAGNOSIS — I10 ESSENTIAL HYPERTENSION: Primary | Chronic | ICD-10-CM

## 2023-01-19 DIAGNOSIS — E66.01 CLASS 3 SEVERE OBESITY DUE TO EXCESS CALORIES WITH SERIOUS COMORBIDITY AND BODY MASS INDEX (BMI) OF 45.0 TO 49.9 IN ADULT: Chronic | ICD-10-CM

## 2023-01-19 DIAGNOSIS — R73.03 PREDIABETES: Chronic | ICD-10-CM

## 2023-01-19 DIAGNOSIS — E03.9 ACQUIRED HYPOTHYROIDISM: ICD-10-CM

## 2023-01-19 PROCEDURE — 99214 OFFICE O/P EST MOD 30 MIN: CPT

## 2023-01-19 PROCEDURE — 93000 ELECTROCARDIOGRAM COMPLETE: CPT

## 2023-01-19 RX ORDER — TAMSULOSIN HYDROCHLORIDE 0.4 MG/1
1 CAPSULE ORAL DAILY
COMMUNITY
Start: 2023-01-02

## 2023-01-19 RX ORDER — TADALAFIL 5 MG/1
5 TABLET ORAL DAILY
COMMUNITY
Start: 2023-01-02

## 2023-01-19 NOTE — PROGRESS NOTES
"Chief Complaint  Med Refill (New pt/bp meds)    Subjective        Rivera Urias presents to [facility]  History of Present Illness    47 y.o. male presenting for chest discomfort, thyroid discussion, leg swelling and to establish care. Previous patient of RENÉ Perez. Works for [employer] in [employer]. Wife is graduate of [employer]. 3 kids ages 19, 15 and 11. Oldest is currently attending [employer]. Has had some leg swelling recently. Taking amlodipine 5 mg daily. Was supposed to have thyroid labs completed in September but never got around to it. Has had some increased difficulty swallowing and noticing firm and sore lumps in neck. Some chest discomfort recently. It is frequent and lasts for several days. Discomfort in left side of sternum and just below collar bone. Has increased Prilosec which has helped some. Burping helps as well. States some shortness of breath but unsure whether related to being out of shape. Not currently feeling any symptoms.         Objective   Vital Signs:  /96 (BP Location: Right arm, Patient Position: Sitting, Cuff Size: Large Adult)   Pulse 79   Temp 97.8 °F (36.6 °C) (Infrared)   Ht 175.3 cm (69\")   Wt (!) 137 kg (303 lb)   SpO2 97%   BMI 44.75 kg/m²   Estimated body mass index is 44.75 kg/m² as calculated from the following:    Height as of this encounter: 175.3 cm (69\").    Weight as of this encounter: 137 kg (303 lb).             Physical Exam  Vitals reviewed.   Constitutional:       Appearance: Normal appearance. He is obese.   Cardiovascular:      Rate and Rhythm: Normal rate and regular rhythm.      Pulses: Normal pulses.      Heart sounds: Normal heart sounds.   Pulmonary:      Effort: Pulmonary effort is normal.      Breath sounds: Normal breath sounds.   Musculoskeletal:         General: No swelling. Normal range of motion.      Cervical back: Normal range of motion.   Skin:     General: Skin is warm and dry.      Capillary Refill: " Capillary refill takes less than 2 seconds.   Neurological:      General: No focal deficit present.      Mental Status: He is alert and oriented to person, place, and time.   Psychiatric:         Mood and Affect: Mood normal.         Behavior: Behavior normal.         Thought Content: Thought content normal.         Judgment: Judgment normal.        Result Review :    Common labs    Common Labs 7/5/22 7/5/22 7/5/22 7/5/22 7/5/22    0859 [id number] 0859 0859 0859   Glucose    100 (A)    BUN    15    Creatinine    1.13    Sodium    138    Potassium    4.4    Chloride    100    Calcium    9.4    Total Protein    7.5    Albumin    4.3    Total Bilirubin    0.2    Alkaline Phosphatase    90    AST (SGOT)    30    ALT (SGPT)    41    WBC     8.8   Hemoglobin     15.3   Hematocrit     46.5   Platelets     234   Total Cholesterol   202 (A)     Triglycerides   173 (A)     HDL Cholesterol   48     LDL Cholesterol    123 (A)     Hemoglobin A1C  5.8 (A)      PSA 0.3       (A) Abnormal value       Comments are available for some flowsheets but are not being displayed.           Current Outpatient Medications on File Prior to Visit   Medication Sig Dispense Refill   • amLODIPine (NORVASC) 5 MG tablet Take 1 tablet by mouth Daily. 30 tablet 0   • fluticasone (FLONASE) 50 MCG/ACT nasal spray 2 sprays into the nostril(s) as directed by provider Daily. 15.8 mL 2   • sildenafil (VIAGRA) 100 MG tablet 1/2-1 tab po 30-60 min before intercourse 30 tablet 2   • tadalafil (CIALIS) 5 MG tablet Take 5 mg by mouth Daily.     • tamsulosin (FLOMAX) 0.4 MG capsule 24 hr capsule Take 1 capsule by mouth Daily.     • terbinafine (lamiSIL) 250 MG tablet Take one tablet by mouth daily for 12 weeks. 90 tablet 0     No current facility-administered medications on file prior to visit.               ECG 12 Lead    Date/Time: 1/19/2023 10:15 AM  Performed by: Prakash Marr APRN  Authorized by: Prakash Marr APRN   Previous ECG: no previous ECG  available  Rhythm: sinus rhythm  Rate: normal    Clinical impression: normal ECG              Assessment and Plan   Diagnoses and all orders for this visit:    1. Essential hypertension (Primary)  -     Comprehensive Metabolic Panel  -     CBC (No Diff)  -     ECG 12 Lead    2. Class 3 severe obesity due to excess calories with serious comorbidity and body mass index (BMI) of 45.0 to 49.9 in adult (HCC)  -     Lipid Panel With / Chol / HDL Ratio  -     Comprehensive Metabolic Panel    3. Prediabetes  -     Hemoglobin A1c    4. Acquired hypothyroidism  -     TSH  -     T4, Free  -     CBC (No Diff)  -     ECG 12 Lead  -     US Head Neck Soft Tissue; Future    5. Chest discomfort  -     ECG 12 Lead      Patient Instructions   Continue medications as prescribed. Limit salt intake. Increase physical activity where possible. Stay hydrated with water. US of neck to be completed. Scheduling center to call with appointment. Labs today. If needed, referrals to endocrinology to be ordered. Follow-up in 2 months for recheck.              Follow Up   Return in about 2 months (around 3/19/2023) for Recheck.  Patient was given instructions and counseling regarding his condition or for health maintenance advice. Please see specific information pulled into the AVS if appropriate.

## 2023-01-19 NOTE — PATIENT INSTRUCTIONS
Continue medications as prescribed. Limit salt intake. Increase physical activity where possible. Stay hydrated with water. US of neck to be completed. Scheduling center to call with appointment. Labs today. If needed, referrals to endocrinology to be ordered. Follow-up in 2 months for recheck.

## 2023-01-20 LAB
ALBUMIN SERPL-MCNC: 4.4 G/DL (ref 3.5–5.2)
ALBUMIN/GLOB SERPL: 1.6 G/DL
ALP SERPL-CCNC: 83 U/L (ref 39–117)
ALT SERPL-CCNC: 26 U/L (ref 1–41)
AST SERPL-CCNC: 22 U/L (ref 1–40)
BILIRUB SERPL-MCNC: 0.2 MG/DL (ref 0–1.2)
BUN SERPL-MCNC: 14 MG/DL (ref 6–20)
BUN/CREAT SERPL: 13.5 (ref 7–25)
CALCIUM SERPL-MCNC: 9.6 MG/DL (ref 8.6–10.5)
CHLORIDE SERPL-SCNC: 103 MMOL/L (ref 98–107)
CHOLEST SERPL-MCNC: 195 MG/DL (ref 0–200)
CHOLEST/HDLC SERPL: 3.75 {RATIO}
CO2 SERPL-SCNC: 28.4 MMOL/L (ref 22–29)
CREAT SERPL-MCNC: 1.04 MG/DL (ref 0.76–1.27)
EGFRCR SERPLBLD CKD-EPI 2021: 89.7 ML/MIN/1.73
ERYTHROCYTE [DISTWIDTH] IN BLOOD BY AUTOMATED COUNT: 13.3 % (ref 12.3–15.4)
GLOBULIN SER CALC-MCNC: 2.7 GM/DL
GLUCOSE SERPL-MCNC: 91 MG/DL (ref 65–99)
HBA1C MFR BLD: 5.6 % (ref 4.8–5.6)
HCT VFR BLD AUTO: 42.6 % (ref 37.5–51)
HDLC SERPL-MCNC: 52 MG/DL (ref 40–60)
HGB BLD-MCNC: 14.1 G/DL (ref 13–17.7)
LDLC SERPL CALC-MCNC: 128 MG/DL (ref 0–100)
MCH RBC QN AUTO: 27.9 PG (ref 26.6–33)
MCHC RBC AUTO-ENTMCNC: 33.1 G/DL (ref 31.5–35.7)
MCV RBC AUTO: 84.2 FL (ref 79–97)
PLATELET # BLD AUTO: 239 10*3/MM3 (ref 140–450)
POTASSIUM SERPL-SCNC: 4.7 MMOL/L (ref 3.5–5.2)
PROT SERPL-MCNC: 7.1 G/DL (ref 6–8.5)
RBC # BLD AUTO: 5.06 10*6/MM3 (ref 4.14–5.8)
SODIUM SERPL-SCNC: 140 MMOL/L (ref 136–145)
T4 FREE SERPL-MCNC: 0.94 NG/DL (ref 0.93–1.7)
TRIGL SERPL-MCNC: 80 MG/DL (ref 0–150)
TSH SERPL DL<=0.005 MIU/L-ACNC: 2.59 UIU/ML (ref 0.27–4.2)
VLDLC SERPL CALC-MCNC: 15 MG/DL (ref 5–40)
WBC # BLD AUTO: 7.63 10*3/MM3 (ref 3.4–10.8)

## 2023-01-23 DIAGNOSIS — I10 ESSENTIAL HYPERTENSION: ICD-10-CM

## 2023-01-23 NOTE — TELEPHONE ENCOUNTER
Rx Refill Note  Requested Prescriptions     Pending Prescriptions Disp Refills   • amLODIPine (NORVASC) 5 MG tablet [Pharmacy Med Name: amLODIPine BESYLATE 5 MG TAB] 30 tablet 0     Sig: TAKE ONE TABLET BY MOUTH DAILY      Last office visit with prescribing clinician: Visit date not found   Last telemedicine visit with prescribing clinician: 3/31/2023   Next office visit with prescribing clinician: Visit date not found     Marlyn Elise MA  01/23/23, 15:41 EST

## 2023-01-24 RX ORDER — AMLODIPINE BESYLATE 5 MG/1
TABLET ORAL
Qty: 30 TABLET | Refills: 0 | Status: SHIPPED | OUTPATIENT
Start: 2023-01-24 | End: 2023-02-27 | Stop reason: SDUPTHER

## 2023-02-01 ENCOUNTER — TELEMEDICINE (OUTPATIENT)
Dept: FAMILY MEDICINE CLINIC | Facility: TELEHEALTH | Age: 48
End: 2023-02-01
Payer: COMMERCIAL

## 2023-02-01 DIAGNOSIS — J01.40 ACUTE NON-RECURRENT PANSINUSITIS: Primary | ICD-10-CM

## 2023-02-01 PROCEDURE — 99213 OFFICE O/P EST LOW 20 MIN: CPT | Performed by: NURSE PRACTITIONER

## 2023-02-01 RX ORDER — OXYMETAZOLINE HYDROCHLORIDE 0.05 G/100ML
2 SPRAY NASAL 2 TIMES DAILY
Qty: 2 ML | Refills: 0 | Status: SHIPPED | OUTPATIENT
Start: 2023-02-01 | End: 2023-02-04

## 2023-02-01 RX ORDER — METHYLPREDNISOLONE 4 MG/1
TABLET ORAL
Qty: 21 TABLET | Refills: 0 | Status: SHIPPED | OUTPATIENT
Start: 2023-02-01

## 2023-02-01 NOTE — PROGRESS NOTES
Overall labs look good.    Thyroid levels, hemoglobin A1c, metabolic panel and CBC are within normal limits.  No signs of infection or anemia.  No signs of thyroid disease or diabetes.    LDL (bad cholesterol) elevated at 128.  This is stable from the past year.  Continue to limit intake of fried foods, high fat dairy products, red meat and pork.  Stay hydrated with water.

## 2023-02-01 NOTE — PROGRESS NOTES
Subjective   Chief Complaint   Patient presents with   • Sinusitis       Rivera Urias is a 47 y.o. male.     History of Present Illness  Patient reports severe nasal congestion for 2 to 3 days.  He is unable to breathe out of either nostril and this is affecting sleep.  He has a lot of pressure in his head but nasal drainage when he blows his nose is all clear.  He has been using OTC meds with no relief.  Sinusitis  This is a new problem. Episode onset: 2-3 days. The problem is unchanged. There has been no fever. Associated symptoms include congestion, headaches and sinus pressure. Pertinent negatives include no chills, coughing, diaphoresis, neck pain, sore throat or swollen glands. Treatments tried: flonase, mucinex. The treatment provided no relief.        No Known Allergies    Past Medical History:   Diagnosis Date   • Hypertension        History reviewed. No pertinent surgical history.    Social History     Socioeconomic History   • Marital status:    Tobacco Use   • Smoking status: Never   • Smokeless tobacco: Never   Substance and Sexual Activity   • Alcohol use: Never   • Drug use: Never       Family History   Problem Relation Age of Onset   • Obesity Other    • Sleep apnea Other    • Thyroid disease Other    • Hypertension Other          Current Outpatient Medications:   •  amLODIPine (NORVASC) 5 MG tablet, TAKE ONE TABLET BY MOUTH DAILY, Disp: 30 tablet, Rfl: 0  •  fluticasone (FLONASE) 50 MCG/ACT nasal spray, 2 sprays into the nostril(s) as directed by provider Daily., Disp: 15.8 mL, Rfl: 2  •  methylPREDNISolone (MEDROL) 4 MG dose pack, Take as directed on package instructions., Disp: 21 tablet, Rfl: 0  •  oxymetazoline (Afrin Nasal Spray) 0.05 % nasal spray, 2 sprays into the nostril(s) as directed by provider 2 (Two) Times a Day for 3 days., Disp: 2 mL, Rfl: 0  •  sildenafil (VIAGRA) 100 MG tablet, 1/2-1 tab po 30-60 min before intercourse, Disp: 30 tablet, Rfl: 2  •  tadalafil (CIALIS) 5 MG  tablet, Take 5 mg by mouth Daily., Disp: , Rfl:   •  tamsulosin (FLOMAX) 0.4 MG capsule 24 hr capsule, Take 1 capsule by mouth Daily., Disp: , Rfl:   •  terbinafine (lamiSIL) 250 MG tablet, Take one tablet by mouth daily for 12 weeks., Disp: 90 tablet, Rfl: 0      Review of Systems   Constitutional: Negative for chills, diaphoresis, fatigue and fever.   HENT: Positive for congestion, postnasal drip, sinus pressure and voice change. Negative for sore throat and swollen glands.    Respiratory: Negative for cough, chest tightness and wheezing.    Cardiovascular: Negative for chest pain and palpitations.   Gastrointestinal: Negative.    Musculoskeletal: Negative for myalgias and neck pain.   Neurological: Positive for headache.        There were no vitals filed for this visit.    Objective   Physical Exam  HENT:      Nose: Congestion (per pt) present.      Right Sinus: No maxillary sinus tenderness or frontal sinus tenderness.      Left Sinus: No maxillary sinus tenderness or frontal sinus tenderness.      Mouth/Throat:      Lips: Pink.   Pulmonary:      Effort: Pulmonary effort is normal.   Neurological:      Mental Status: He is alert and oriented to person, place, and time.          Procedures     Assessment & Plan   Diagnoses and all orders for this visit:    1. Acute non-recurrent pansinusitis (Primary)  -     oxymetazoline (Afrin Nasal Spray) 0.05 % nasal spray; 2 sprays into the nostril(s) as directed by provider 2 (Two) Times a Day for 3 days.  Dispense: 2 mL; Refill: 0  -     methylPREDNISolone (MEDROL) 4 MG dose pack; Take as directed on package instructions.  Dispense: 21 tablet; Refill: 0            PLAN: Discussed dosing, side effects, recommended other symptomatic care.  Patient should follow up with primary care provider, Urgent Care or ER if symptoms worsen, fail to resolve or other symptoms need attention. Patient/family agree to the above.         RENÉ Hansen     The use of a video visit has  been reviewed with the patient and verbal informed consent has been obtained. Myself and Rivera Urias participated in this visit. The patient is located at [address] [place] [address]. I am located in [place], [place]. [employer] and [employer] were utilized.        This visit was performed via Telehealth.  This patient has been instructed to follow-up with their primary care provider if their symptoms worsen or the treatment provided does not resolve their illness.

## 2023-02-03 ENCOUNTER — HOSPITAL ENCOUNTER (OUTPATIENT)
Dept: ULTRASOUND IMAGING | Facility: HOSPITAL | Age: 48
Discharge: HOME | End: 2023-02-03
Payer: COMMERCIAL

## 2023-02-03 DIAGNOSIS — J01.40 ACUTE NON-RECURRENT PANSINUSITIS: Primary | ICD-10-CM

## 2023-02-03 DIAGNOSIS — E03.9 ACQUIRED HYPOTHYROIDISM: ICD-10-CM

## 2023-02-03 PROCEDURE — 76536 US EXAM OF HEAD AND NECK: CPT

## 2023-02-07 ENCOUNTER — TELEPHONE (OUTPATIENT)
Dept: INTERNAL MEDICINE | Facility: CLINIC | Age: 48
End: 2023-02-07
Payer: COMMERCIAL

## 2023-02-07 NOTE — TELEPHONE ENCOUNTER
Patient returned call in regards to thyroid. He would like to know next steps, since thyroid came back ok. He is still having trouble swallowing

## 2023-02-08 NOTE — TELEPHONE ENCOUNTER
Ultrasound of thyroid and neck showed thyroid and lymph nodes are of normal size.  Thyroid levels are within normal limits.  Trouble swallowing may require an EGD to be performed.  This also could help investigate some of the indigestion and chest pressure. Recommend referral to gastroenterology to discuss swallowing issues, other digestive concerns and possible EGD.  Please let the office know if he would like for the referral to be placed.    If swallowing becomes increasingly difficult or disruptive breathing, seek emergency medical treatment at the ER.

## 2023-02-09 NOTE — TELEPHONE ENCOUNTER
Left message on vm, if he wishes to have referral placed call back and if symptoms worsen causing SOB go to ER

## 2023-02-27 DIAGNOSIS — I10 ESSENTIAL HYPERTENSION: ICD-10-CM

## 2023-02-27 RX ORDER — AMLODIPINE BESYLATE 5 MG/1
5 TABLET ORAL DAILY
Qty: 30 TABLET | Refills: 0 | Status: SHIPPED | OUTPATIENT
Start: 2023-02-27 | End: 2023-03-29 | Stop reason: SDUPTHER

## 2023-02-27 NOTE — TELEPHONE ENCOUNTER
Caller: Rivera Urias    Relationship: Self    Best call back number: [phone]    Requested Prescriptions:   Requested Prescriptions     Pending Prescriptions Disp Refills   • amLODIPine (NORVASC) 5 MG tablet 30 tablet 0     Sig: Take 1 tablet by mouth Daily.        Pharmacy where request should be sent: [facility] [id number] [facility] [id number] [facility] AT [facility] & [place] - [phone]  - [phone] FX     Additional details provided by patient: PATIENT STATES THAT HE IS COMPLETELY OUT OF THIS MEDICATION.  HE REQUESTS SEVERAL REFILLS BE SENT TO HIS PHARMACY.    Does the patient have less than a 3 day supply:  [x] Yes  [] No    Would you like a call back once the refill request has been completed: [x] Yes [] No    If the office needs to give you a call back, can they leave a voicemail: [x] Yes [] No    PLEASE ADVISE.    Alma Rosa King Rep   02/27/23 09:00 EST

## 2023-03-28 DIAGNOSIS — I10 ESSENTIAL HYPERTENSION: ICD-10-CM

## 2023-03-29 DIAGNOSIS — I10 ESSENTIAL HYPERTENSION: ICD-10-CM

## 2023-03-29 RX ORDER — AMLODIPINE BESYLATE 5 MG/1
TABLET ORAL
Qty: 30 TABLET | Refills: 0 | Status: SHIPPED | OUTPATIENT
Start: 2023-03-29

## 2023-03-29 RX ORDER — AMLODIPINE BESYLATE 5 MG/1
5 TABLET ORAL DAILY
Qty: 30 TABLET | Refills: 0 | Status: SHIPPED | OUTPATIENT
Start: 2023-03-29

## 2023-03-29 NOTE — TELEPHONE ENCOUNTER
Rx Refill Note  Requested Prescriptions     Pending Prescriptions Disp Refills   • amLODIPine (NORVASC) 5 MG tablet [Pharmacy Med Name: amLODIPine BESYLATE 5 MG TAB] 30 tablet 0     Sig: TAKE ONE TABLET BY MOUTH DAILY      Last office visit with prescribing clinician: 1/19/2023   Last telemedicine visit with prescribing clinician: 4/11/2023   Next office visit with prescribing clinician: 4/11/2023         Berta Castro MA  03/29/23, 08:04 EDT

## 2023-04-11 ENCOUNTER — OFFICE VISIT (OUTPATIENT)
Dept: INTERNAL MEDICINE | Facility: CLINIC | Age: 48
End: 2023-04-11
Payer: COMMERCIAL

## 2023-04-11 VITALS
SYSTOLIC BLOOD PRESSURE: 132 MMHG | HEIGHT: 69 IN | HEART RATE: 73 BPM | WEIGHT: 306 LBS | DIASTOLIC BLOOD PRESSURE: 89 MMHG | BODY MASS INDEX: 45.32 KG/M2 | OXYGEN SATURATION: 97 %

## 2023-04-11 DIAGNOSIS — I10 ESSENTIAL HYPERTENSION: ICD-10-CM

## 2023-04-11 DIAGNOSIS — E66.01 CLASS 3 SEVERE OBESITY DUE TO EXCESS CALORIES WITH SERIOUS COMORBIDITY AND BODY MASS INDEX (BMI) OF 45.0 TO 49.9 IN ADULT: Primary | Chronic | ICD-10-CM

## 2023-04-11 PROCEDURE — 99213 OFFICE O/P EST LOW 20 MIN: CPT

## 2023-04-11 RX ORDER — AMLODIPINE BESYLATE 5 MG/1
5 TABLET ORAL DAILY
Qty: 90 TABLET | Refills: 1 | Status: SHIPPED | OUTPATIENT
Start: 2023-04-11

## 2023-04-11 NOTE — PROGRESS NOTES
"Chief Complaint  Hypertension, Follow-up, and Results (thyroid)    Subjective        Rivera Urias presents to [facility]  History of Present Illness  57-year-old male presenting with hypertension and to discuss weight management.  Has been taking amlodipine 5 mg and tolerating medication well.  Has noticed some slight swelling in his lower extremities that is tolerable.  Denies chest pain, dizziness, headaches, difficulty breathing.    Thyroid levels last checked at previous visit are within normal limits.  His previous swallowing issue has improved slightly.  Does not have any triggers but does have occasional difficulty swallowing foods.  No issues with swallowing fluids.  Denies neck or throat pain, fever.    Has been struggling with weight throughout his life.  Has tried HMR, weight watchers and other programs.  Is not as physically active as he would like to be.  But he does feel better overall since being more consistent on his vitamins and supplements.  Is wanting to discuss weight management options.        Objective   Vital Signs:  /89 (BP Location: Left arm, Patient Position: Sitting, Cuff Size: Large Adult)   Pulse 73   Ht 175.3 cm (69\")   Wt (!) 139 kg (306 lb)   SpO2 97%   BMI 45.19 kg/m²   Estimated body mass index is 45.19 kg/m² as calculated from the following:    Height as of this encounter: 175.3 cm (69\").    Weight as of this encounter: 139 kg (306 lb).       Class 3 Severe Obesity (BMI >=40). Obesity-related health conditions include the following: hypertension. Obesity is unchanged. BMI is is above average; no BMI management plan is appropriate. We discussed portion control and increasing exercise.      Physical Exam  Vitals reviewed.   Constitutional:       Appearance: Normal appearance. He is obese.   Musculoskeletal:         General: Normal range of motion.      Cervical back: Normal range of motion.   Skin:     General: Skin is warm and dry.      " Capillary Refill: Capillary refill takes less than 2 seconds.   Neurological:      General: No focal deficit present.      Mental Status: He is alert and oriented to person, place, and time.   Psychiatric:         Mood and Affect: Mood normal.         Behavior: Behavior normal.         Thought Content: Thought content normal.         Judgment: Judgment normal.        Result Review :    Common labs        7/5/2022    08:59 1/19/2023    10:24   Common Labs   Glucose 100   91     BUN 15   14     Creatinine 1.13   1.04     Sodium 138   140     Potassium 4.4   4.7     Chloride 100   103     Calcium 9.4   9.6     Total Protein 7.5   7.1     Albumin 4.3   4.4     Total Bilirubin 0.2   0.2     Alkaline Phosphatase 90   83     AST (SGOT) 30   22     ALT (SGPT) 41   26     WBC 8.8   7.63     Hemoglobin 15.3   14.1     Hematocrit 46.5   42.6     Platelets 234   239     Total Cholesterol 202   195     Triglycerides 173   80     HDL Cholesterol 48   52     LDL Cholesterol  123   128     Hemoglobin A1C 5.8   5.60     PSA 0.3            Current Outpatient Medications on File Prior to Visit   Medication Sig Dispense Refill   • fluticasone (FLONASE) 50 MCG/ACT nasal spray 2 sprays into the nostril(s) as directed by provider Daily. 15.8 mL 2   • sildenafil (VIAGRA) 100 MG tablet 1/2-1 tab po 30-60 min before intercourse 30 tablet 2   • tadalafil (CIALIS) 5 MG tablet Take 1 tablet by mouth Daily.     • tamsulosin (FLOMAX) 0.4 MG capsule 24 hr capsule Take 1 capsule by mouth Daily.     • [DISCONTINUED] amLODIPine (NORVASC) 5 MG tablet TAKE ONE TABLET BY MOUTH DAILY 30 tablet 0   • [DISCONTINUED] amLODIPine (NORVASC) 5 MG tablet Take 1 tablet by mouth Daily. 30 tablet 0   • [DISCONTINUED] methylPREDNISolone (MEDROL) 4 MG dose pack Take as directed on package instructions. (Patient not taking: Reported on 4/11/2023) 21 tablet 0   • [DISCONTINUED] terbinafine (lamiSIL) 250 MG tablet Take one tablet by mouth daily for 12 weeks. (Patient  not taking: Reported on 4/11/2023) 90 tablet 0     No current facility-administered medications on file prior to visit.              Assessment and Plan   Diagnoses and all orders for this visit:    1. Class 3 severe obesity due to excess calories with serious comorbidity and body mass index (BMI) of 45.0 to 49.9 in adult (Primary)    2. Essential hypertension  -     amLODIPine (NORVASC) 5 MG tablet; Take 1 tablet by mouth Daily.  Dispense: 90 tablet; Refill: 1      Patient Instructions   Amlodipine refill sent. Monitor for increased leg swelling. Start a food journal. Make an exercise plan and prioritize increasing heart rate exercises. Follow-up in 3 months to recheck weight and discuss medication options.              Follow Up   Return in about 3 months (around 7/11/2023) for Recheck.  Patient was given instructions and counseling regarding his condition or for health maintenance advice. Please see specific information pulled into the AVS if appropriate.

## 2023-04-11 NOTE — PATIENT INSTRUCTIONS
Amlodipine refill sent. Monitor for increased leg swelling. Start a food journal. Make an exercise plan and prioritize increasing heart rate exercises. Follow-up in 3 months to recheck weight and discuss medication options.

## 2023-09-29 ENCOUNTER — TELEMEDICINE (OUTPATIENT)
Dept: FAMILY MEDICINE CLINIC | Facility: TELEHEALTH | Age: 48
End: 2023-09-29
Payer: COMMERCIAL

## 2023-09-29 DIAGNOSIS — J01.00 ACUTE NON-RECURRENT MAXILLARY SINUSITIS: Primary | ICD-10-CM

## 2023-09-29 RX ORDER — AMOXICILLIN AND CLAVULANATE POTASSIUM 875; 125 MG/1; MG/1
1 TABLET, FILM COATED ORAL 2 TIMES DAILY
Qty: 14 TABLET | Refills: 0 | Status: SHIPPED | OUTPATIENT
Start: 2023-09-29 | End: 2023-10-06

## 2023-09-29 RX ORDER — METHYLPREDNISOLONE 4 MG/1
TABLET ORAL
Qty: 1 EACH | Refills: 0 | Status: SHIPPED | OUTPATIENT
Start: 2023-09-29

## 2023-09-29 NOTE — PROGRESS NOTES
Chief Complaint   Patient presents with    Cough    Nasal Congestion       Video Visit Reason:   Free Text Description: Prescription to remedy recurring congestion and cough  Subjective   Rivera Urias is a 47 y.o. male.     History of Present Illness  Sinus congestion, pressure and drainage for several weeks with a period of a few days, in which symptoms did improve, but then returned. He has had postnasal drainage causing cough that has become more productive, worse at night and with lying down. No fever, chills, shortness of breath or wheezing.  Cough  This is a new problem. The current episode started in the past 7 days. The problem has been unchanged. The cough is Productive of sputum. Associated symptoms include headaches, nasal congestion and postnasal drip. Pertinent negatives include no chest pain, chills, ear pain, fever, sore throat, shortness of breath or wheezing. The symptoms are aggravated by lying down. He has tried OTC cough suppressant for the symptoms. The treatment provided no relief. His past medical history is significant for environmental allergies.     The following portions of the patient's history were reviewed and updated as appropriate: allergies, current medications, past medical history, and problem list.      Past Medical History:   Diagnosis Date    Allergic Childhood    Hypertension     Obesity      Social History     Socioeconomic History    Marital status:    Tobacco Use    Smoking status: Never    Smokeless tobacco: Never   Substance and Sexual Activity    Alcohol use: Never    Drug use: Never    Sexual activity: Yes     medication documentation: reviewed and updated with patient and   Current Outpatient Medications:     amLODIPine (NORVASC) 5 MG tablet, Take 1 tablet by mouth Daily., Disp: 90 tablet, Rfl: 1    fluticasone (FLONASE) 50 MCG/ACT nasal spray, 2 sprays into the nostril(s) as directed by provider Daily., Disp: 15.8 mL, Rfl: 2    sildenafil (VIAGRA) 100 MG tablet,  1/2-1 tab po 30-60 min before intercourse, Disp: 30 tablet, Rfl: 2    tadalafil (CIALIS) 5 MG tablet, Take 1 tablet by mouth Daily., Disp: , Rfl:     tamsulosin (FLOMAX) 0.4 MG capsule 24 hr capsule, Take 1 capsule by mouth Daily., Disp: , Rfl:     amoxicillin-clavulanate (AUGMENTIN) 875-125 MG per tablet, Take 1 tablet by mouth 2 (Two) Times a Day for 7 days., Disp: 14 tablet, Rfl: 0    methylPREDNISolone (MEDROL) 4 MG dose pack, Take as directed on package instructions., Disp: 1 each, Rfl: 0  Review of Systems   Constitutional:  Negative for activity change, appetite change, chills and fever.   HENT:  Positive for congestion, postnasal drip and sinus pressure. Negative for ear pain, sore throat, trouble swallowing and voice change.    Respiratory:  Positive for cough. Negative for chest tightness, shortness of breath and wheezing.    Cardiovascular:  Negative for chest pain.   Allergic/Immunologic: Positive for environmental allergies.   Neurological:  Positive for headaches. Negative for dizziness and light-headedness.     Objective   Physical Exam  Constitutional:       Appearance: Normal appearance. He is well-developed.   HENT:      Nose: Congestion present.      Right Sinus: No maxillary sinus tenderness.      Left Sinus: No maxillary sinus tenderness.   Eyes:      Conjunctiva/sclera: Conjunctivae normal.   Pulmonary:      Effort: Pulmonary effort is normal.   Psychiatric:         Speech: Speech normal.       Assessment & Plan   Diagnoses and all orders for this visit:    1. Acute non-recurrent maxillary sinusitis (Primary)  -     amoxicillin-clavulanate (AUGMENTIN) 875-125 MG per tablet; Take 1 tablet by mouth 2 (Two) Times a Day for 7 days.  Dispense: 14 tablet; Refill: 0  -     methylPREDNISolone (MEDROL) 4 MG dose pack; Take as directed on package instructions.  Dispense: 1 each; Refill: 0                    Follow Up:  If your symptoms are not resolving by the completion of your treatment or are  worsening, see your primary care provider for follow up. If you don't have a primary care provider, you may go to any Urgent Care for re-evaluation. If you develop any life threatening symptoms, go to the nearest Emergency Department immediately or call EMS.               The use of  Video Visit was utilized during this visit, using both [employer] and [employer]. The use of a video visit has been reviewed with the patient and verbal informed consent has been obtained. No technical difficulties occurred during the visit.    is located at [address] [place], [place] [address]  Provider is located at [place], [place]

## 2023-11-19 DIAGNOSIS — I10 ESSENTIAL HYPERTENSION: ICD-10-CM

## 2023-11-20 RX ORDER — AMLODIPINE BESYLATE 5 MG/1
5 TABLET ORAL DAILY
Qty: 90 TABLET | Refills: 1 | OUTPATIENT
Start: 2023-11-20

## 2023-11-27 DIAGNOSIS — I10 ESSENTIAL HYPERTENSION: ICD-10-CM

## 2023-11-27 RX ORDER — AMLODIPINE BESYLATE 5 MG/1
5 TABLET ORAL DAILY
Qty: 90 TABLET | Refills: 1 | OUTPATIENT
Start: 2023-11-27

## 2023-11-27 RX ORDER — AMLODIPINE BESYLATE 5 MG/1
5 TABLET ORAL DAILY
Qty: 90 TABLET | Refills: 1 | Status: SHIPPED | OUTPATIENT
Start: 2023-11-27

## 2023-11-27 NOTE — TELEPHONE ENCOUNTER
PLEASE CALL PATIENT TO CONFIRM     Caller: Rivera Urias    Relationship: Self    Best call back number:     [phone] (Mobile)       Requested Prescriptions:   amLODIPine (NORVASC) 5 MG tablet  5 mg, Daily 1 ordered          Summary: Take 1 tablet by mouth Daily.            Pharmacy where request should be sent:      Last office visit with prescribing clinician: 4/11/2023   Last telemedicine visit with prescribing clinician: Visit date not found   Next office visit with prescribing clinician: 12/5/2023     Additional details provided by patient: OUT OF MEDS AND APPT SCHEDULED - SEND EMERGENCY SUPPLY PLEASE    Does the patient have less than a 3 day supply:  [x] Yes  [] No    Would you like a call back once the refill request has been completed: [x] Yes [] No    If the office needs to give you a call back, can they leave a voicemail: [] Yes [] No    Alma Rosa Maguire Rep   11/27/23 09:24 EST

## 2023-12-05 ENCOUNTER — OFFICE VISIT (OUTPATIENT)
Dept: INTERNAL MEDICINE | Facility: CLINIC | Age: 48
End: 2023-12-05
Payer: COMMERCIAL

## 2023-12-05 VITALS
OXYGEN SATURATION: 98 % | HEART RATE: 87 BPM | BODY MASS INDEX: 43.55 KG/M2 | HEIGHT: 69 IN | WEIGHT: 294 LBS | DIASTOLIC BLOOD PRESSURE: 84 MMHG | SYSTOLIC BLOOD PRESSURE: 144 MMHG

## 2023-12-05 DIAGNOSIS — Z12.11 SCREENING FOR COLON CANCER: ICD-10-CM

## 2023-12-05 DIAGNOSIS — E66.01 CLASS 3 SEVERE OBESITY DUE TO EXCESS CALORIES WITH SERIOUS COMORBIDITY AND BODY MASS INDEX (BMI) OF 45.0 TO 49.9 IN ADULT: Primary | Chronic | ICD-10-CM

## 2023-12-05 DIAGNOSIS — I10 ESSENTIAL HYPERTENSION: Chronic | ICD-10-CM

## 2023-12-05 DIAGNOSIS — R53.83 LOW ENERGY: ICD-10-CM

## 2023-12-05 DIAGNOSIS — Z23 IMMUNIZATION DUE: ICD-10-CM

## 2023-12-05 NOTE — PATIENT INSTRUCTIONS
Continue medications as prescribed. Monitor blood pressure at home. Communicate via MyChart about measurements. Increase activity. Stay hydrated with water. Cologuard kit to be sent to home. Follow instructions. Labs today. Follow-up in 6 months for annual and fasting labs.

## 2023-12-05 NOTE — PROGRESS NOTES
"Chief Complaint  Follow-up    Subjective        Rivera Urias presents to [facility]  History of Present Illness  48-year-old male presenting with hypertension follow-up.  Has lost just over 10 pounds since last visit.  Was more active but is decreased activity in the past 3 to 4 weeks.  States he is \"getting back into bad habits\".  Has had increased work and life stresses well.  Swelling in lower extremities has not gotten any worse.  It comes and goes.  Taking amlodipine 5 mg daily and tolerating well.  Has been experiencing decreased energy levels and noticed some difficulty with losing weight as quickly as he had in the past.  Sees urology for erectile dysfunction.    Would like flu and COVID booster today.  Discussed Cologuard versus colonoscopy for colon cancer screening.  Does not have any abdominal pain or bloody stools.      Objective   Vital Signs:  /84 (BP Location: Left arm, Patient Position: Sitting, Cuff Size: Adult)   Pulse 87   Ht 175.3 cm (69\")   Wt 133 kg (294 lb)   SpO2 98%   BMI 43.42 kg/m²   Estimated body mass index is 43.42 kg/m² as calculated from the following:    Height as of this encounter: 175.3 cm (69\").    Weight as of this encounter: 133 kg (294 lb).               Physical Exam  Vitals reviewed.   Constitutional:       Appearance: Normal appearance.   Musculoskeletal:         General: Normal range of motion.      Cervical back: Normal range of motion.   Skin:     General: Skin is warm and dry.      Capillary Refill: Capillary refill takes less than 2 seconds.   Neurological:      General: No focal deficit present.      Mental Status: He is alert and oriented to person, place, and time.   Psychiatric:         Mood and Affect: Mood normal.         Behavior: Behavior normal.         Thought Content: Thought content normal.         Judgment: Judgment normal.        Result Review :    Common labs          1/19/2023    10:24   Common Labs   Glucose 91 "    BUN 14    Creatinine 1.04    Sodium 140    Potassium 4.7    Chloride 103    Calcium 9.6    Total Protein 7.1    Albumin 4.4    Total Bilirubin 0.2    Alkaline Phosphatase 83    AST (SGOT) 22    ALT (SGPT) 26    WBC 7.63    Hemoglobin 14.1    Hematocrit 42.6    Platelets 239    Total Cholesterol 195    Triglycerides 80    HDL Cholesterol 52    LDL Cholesterol  128    Hemoglobin A1C 5.60          Current Outpatient Medications on File Prior to Visit   Medication Sig Dispense Refill    amLODIPine (NORVASC) 5 MG tablet Take 1 tablet by mouth Daily. 90 tablet 1    fluticasone (FLONASE) 50 MCG/ACT nasal spray 2 sprays into the nostril(s) as directed by provider Daily. 15.8 mL 2    tadalafil (CIALIS) 5 MG tablet Take 1 tablet by mouth Daily.      tamsulosin (FLOMAX) 0.4 MG capsule 24 hr capsule Take 1 capsule by mouth Daily.      [DISCONTINUED] methylPREDNISolone (MEDROL) 4 MG dose pack Take as directed on package instructions. 1 each 0    [DISCONTINUED] sildenafil (VIAGRA) 100 MG tablet 1/2-1 tab po 30-60 min before intercourse 30 tablet 2     No current facility-administered medications on file prior to visit.              Assessment and Plan   Diagnoses and all orders for this visit:    1. Class 3 severe obesity due to excess calories with serious comorbidity and body mass index (BMI) of 45.0 to 49.9 in adult (Primary)  -     Testosterone (Free & Total), LC / MS    2. Low energy  -     Testosterone (Free & Total), LC / MS    3. Essential hypertension    4. Screening for colon cancer  -     Cologuard - Stool, Per Rectum; Future    5. Immunization due  -     Fluzone >6 Months (5798-8100)  -     COVID-19 F23 (Pfizer) 12yrs+ (COMIRNATY)      Patient Instructions   Continue medications as prescribed. Monitor blood pressure at home. Communicate via [employer] about measurements. Increase activity. Stay hydrated with water. Cologuard kit to be sent to home. Follow instructions. Labs today. Follow-up in 6 months for annual and  fasting labs.          Follow Up   Return in about 6 months (around 6/5/2024) for Annual physical.  Patient was given instructions and counseling regarding his condition or for health maintenance advice. Please see specific information pulled into the AVS if appropriate.

## 2023-12-08 LAB
TESTOST FREE SERPL-MCNC: 4.1 PG/ML (ref 6.8–21.5)
TESTOST SERPL-MCNC: 217.2 NG/DL (ref 264–916)

## 2023-12-26 ENCOUNTER — PATIENT MESSAGE (OUTPATIENT)
Dept: INTERNAL MEDICINE | Facility: CLINIC | Age: 48
End: 2023-12-26
Payer: COMMERCIAL

## 2023-12-28 DIAGNOSIS — R19.5 POSITIVE COLORECTAL CANCER SCREENING USING COLOGUARD TEST: Primary | ICD-10-CM

## 2023-12-29 ENCOUNTER — TELEPHONE (OUTPATIENT)
Dept: INTERNAL MEDICINE | Facility: CLINIC | Age: 48
End: 2023-12-29
Payer: COMMERCIAL

## 2023-12-29 NOTE — TELEPHONE ENCOUNTER
----- Message from RENÉ Rodriguez sent at 12/28/2023  4:52 PM EST -----    Hub okay to relay  Cologuard is positive.  Placing referral for colonoscopy to be completed.  Scheduling center to call with appointment.

## 2023-12-29 NOTE — TELEPHONE ENCOUNTER
From: Rivera Urias  To: Prakash Marr  Sent: 12/26/2023 1:58 PM EST  Subject: Appointment follow up: Elevated Blood Pressure    Good afternoon. I’m following up relative to my last visit and your guidance to monitor my blood pressure for the next couple of weeks. As guided, I’ve been checking daily and it continues to be averaging around 135/136 (lower in morning and higher during most of day). I’ve also noticed I’ve had a mild/low grade headache. I’m reaching to see if I should continue with the current dosage or if it should be increased.     After this holiday season. I’m fully committed to regaining my focus on healthier eating and getting back to working out with more frequency, however I feel I need a short-term, more immediate plan to get this blood pressure under better control. Is it possible to take two of my current dosage vs one for a short period of time? Let me know what you recommend. Thanks.

## 2024-05-08 DIAGNOSIS — I10 ESSENTIAL HYPERTENSION: ICD-10-CM

## 2024-05-10 RX ORDER — AMLODIPINE BESYLATE 5 MG/1
5 TABLET ORAL DAILY
Qty: 90 TABLET | Refills: 1 | Status: SHIPPED | OUTPATIENT
Start: 2024-05-10

## 2024-06-05 ENCOUNTER — OFFICE VISIT (OUTPATIENT)
Dept: INTERNAL MEDICINE | Facility: CLINIC | Age: 49
End: 2024-06-05
Payer: COMMERCIAL

## 2024-06-05 VITALS
DIASTOLIC BLOOD PRESSURE: 90 MMHG | HEIGHT: 69 IN | SYSTOLIC BLOOD PRESSURE: 130 MMHG | HEART RATE: 73 BPM | OXYGEN SATURATION: 98 % | BODY MASS INDEX: 42.95 KG/M2 | TEMPERATURE: 98.2 F | WEIGHT: 290 LBS

## 2024-06-05 DIAGNOSIS — R13.10 SWALLOWING DYSFUNCTION: ICD-10-CM

## 2024-06-05 DIAGNOSIS — Z00.00 ANNUAL PHYSICAL EXAM: Primary | ICD-10-CM

## 2024-06-05 DIAGNOSIS — I10 ESSENTIAL HYPERTENSION: Chronic | ICD-10-CM

## 2024-06-05 DIAGNOSIS — R73.03 PREDIABETES: Chronic | ICD-10-CM

## 2024-06-05 LAB
ALBUMIN SERPL-MCNC: 4.5 G/DL (ref 3.5–5.2)
ALBUMIN/GLOB SERPL: 1.5 G/DL
ALP SERPL-CCNC: 81 U/L (ref 39–117)
ALT SERPL-CCNC: 22 U/L (ref 1–41)
APPEARANCE UR: CLEAR
AST SERPL-CCNC: 20 U/L (ref 1–40)
BASOPHILS # BLD AUTO: 0.05 10*3/MM3 (ref 0–0.2)
BASOPHILS NFR BLD AUTO: 0.6 % (ref 0–1.5)
BILIRUB SERPL-MCNC: 0.3 MG/DL (ref 0–1.2)
BILIRUB UR QL STRIP: NEGATIVE
BUN SERPL-MCNC: 12 MG/DL (ref 6–20)
BUN/CREAT SERPL: 11.2 (ref 7–25)
CALCIUM SERPL-MCNC: 9.9 MG/DL (ref 8.6–10.5)
CHLORIDE SERPL-SCNC: 106 MMOL/L (ref 98–107)
CHOLEST SERPL-MCNC: 179 MG/DL (ref 0–200)
CHOLEST/HDLC SERPL: 3.58 {RATIO}
CO2 SERPL-SCNC: 26.4 MMOL/L (ref 22–29)
COLOR UR: YELLOW
CREAT SERPL-MCNC: 1.07 MG/DL (ref 0.76–1.27)
EGFRCR SERPLBLD CKD-EPI 2021: 86.1 ML/MIN/1.73
EOSINOPHIL # BLD AUTO: 0.47 10*3/MM3 (ref 0–0.4)
EOSINOPHIL NFR BLD AUTO: 6 % (ref 0.3–6.2)
ERYTHROCYTE [DISTWIDTH] IN BLOOD BY AUTOMATED COUNT: 13.8 % (ref 12.3–15.4)
GLOBULIN SER CALC-MCNC: 3 GM/DL
GLUCOSE SERPL-MCNC: 88 MG/DL (ref 65–99)
GLUCOSE UR QL STRIP: NEGATIVE
HBA1C MFR BLD: 5.6 % (ref 4.8–5.6)
HCT VFR BLD AUTO: 44.2 % (ref 37.5–51)
HDLC SERPL-MCNC: 50 MG/DL (ref 40–60)
HGB BLD-MCNC: 14.5 G/DL (ref 13–17.7)
HGB UR QL STRIP: NEGATIVE
IMM GRANULOCYTES # BLD AUTO: 0.01 10*3/MM3 (ref 0–0.05)
IMM GRANULOCYTES NFR BLD AUTO: 0.1 % (ref 0–0.5)
KETONES UR QL STRIP: NEGATIVE
LDLC SERPL CALC-MCNC: 115 MG/DL (ref 0–100)
LEUKOCYTE ESTERASE UR QL STRIP: NEGATIVE
LYMPHOCYTES # BLD AUTO: 2.72 10*3/MM3 (ref 0.7–3.1)
LYMPHOCYTES NFR BLD AUTO: 34.8 % (ref 19.6–45.3)
MCH RBC QN AUTO: 28.4 PG (ref 26.6–33)
MCHC RBC AUTO-ENTMCNC: 32.8 G/DL (ref 31.5–35.7)
MCV RBC AUTO: 86.5 FL (ref 79–97)
MONOCYTES # BLD AUTO: 0.46 10*3/MM3 (ref 0.1–0.9)
MONOCYTES NFR BLD AUTO: 5.9 % (ref 5–12)
NEUTROPHILS # BLD AUTO: 4.1 10*3/MM3 (ref 1.7–7)
NEUTROPHILS NFR BLD AUTO: 52.6 % (ref 42.7–76)
NITRITE UR QL STRIP: NEGATIVE
NRBC BLD AUTO-RTO: 0 /100 WBC (ref 0–0.2)
PH UR STRIP: 6 [PH] (ref 5–8)
PLATELET # BLD AUTO: 235 10*3/MM3 (ref 140–450)
POTASSIUM SERPL-SCNC: 4.7 MMOL/L (ref 3.5–5.2)
PROT SERPL-MCNC: 7.5 G/DL (ref 6–8.5)
PROT UR QL STRIP: NEGATIVE
RBC # BLD AUTO: 5.11 10*6/MM3 (ref 4.14–5.8)
SODIUM SERPL-SCNC: 143 MMOL/L (ref 136–145)
SP GR UR STRIP: 1.01 (ref 1–1.03)
TRIGL SERPL-MCNC: 76 MG/DL (ref 0–150)
TSH SERPL DL<=0.005 MIU/L-ACNC: 2.06 UIU/ML (ref 0.27–4.2)
UROBILINOGEN UR STRIP-MCNC: NORMAL MG/DL
VLDLC SERPL CALC-MCNC: 14 MG/DL (ref 5–40)
WBC # BLD AUTO: 7.81 10*3/MM3 (ref 3.4–10.8)

## 2024-06-05 PROCEDURE — 99396 PREV VISIT EST AGE 40-64: CPT

## 2024-06-05 NOTE — PATIENT INSTRUCTIONS
Continue medications as prescribed. Increase activity as tolerated. Stay hydrated with water. Research Zepbound and Wegovy for weight loss and insurance coverage. Notify office of coverage and order to be sent for first dose at that time. Follow-up in 4 weeks after starting weight loss medication. Referral to gastroenterology placed. Scheduling center to call with appointment.

## 2024-06-05 NOTE — PROGRESS NOTES
"Chief Complaint  Annual Exam    Subjective        Rivera Urias presents to [facility]  History of Present Illness  48-year-old male presenting for weight management discussion, swallowing issue and annual exam.  Has been having issues with swallowing liquids.  States that it feels like a \"lump internally\" when swallowing fluids.  States that he does not choke and is able to swallow eventually.  Does not cause any coughing.  Denies vomiting or nausea.    Weight has been a chronic issue for him.  States that he is a stress eater.  Like to discuss possible medications to help lower his weight.    Taking amlodipine as prescribed.  Tolerating well.  Has occasional swelling in lower extremities.        Objective   Vital Signs:  /90 (BP Location: Right arm, Patient Position: Sitting, Cuff Size: Large Adult)   Pulse 73   Temp 98.2 °F (36.8 °C)   Ht 175.3 cm (69\")   Wt 132 kg (290 lb)   SpO2 98%   BMI 42.83 kg/m²   Estimated body mass index is 42.83 kg/m² as calculated from the following:    Height as of this encounter: 175.3 cm (69\").    Weight as of this encounter: 132 kg (290 lb).       Class 3 Severe Obesity (BMI >=40). Obesity-related health conditions include the following: hypertension. Obesity is unchanged. BMI is is above average; no BMI management plan is appropriate. We discussed portion control and increasing exercise.      Physical Exam  Vitals reviewed.   Constitutional:       Appearance: Normal appearance. He is obese.   HENT:      Head: Normocephalic.      Right Ear: Tympanic membrane normal.      Left Ear: Tympanic membrane normal.      Nose: Nose normal.      Mouth/Throat:      Mouth: Mucous membranes are moist.      Pharynx: Oropharynx is clear.   Eyes:      Pupils: Pupils are equal, round, and reactive to light.   Cardiovascular:      Rate and Rhythm: Normal rate.      Pulses: Normal pulses.      Heart sounds: Normal heart sounds.   Pulmonary:      Effort: " Pulmonary effort is normal.      Breath sounds: Normal breath sounds.   Abdominal:      General: Bowel sounds are normal.      Palpations: Abdomen is soft.   Musculoskeletal:         General: Normal range of motion.      Cervical back: Normal range of motion.   Skin:     General: Skin is warm and dry.      Capillary Refill: Capillary refill takes less than 2 seconds.   Neurological:      General: No focal deficit present.      Mental Status: He is alert and oriented to person, place, and time.   Psychiatric:         Mood and Affect: Mood normal.         Behavior: Behavior normal.         Thought Content: Thought content normal.         Judgment: Judgment normal.        Result Review :            Current Outpatient Medications on File Prior to Visit   Medication Sig Dispense Refill    amLODIPine (NORVASC) 5 MG tablet TAKE 1 TABLET BY MOUTH DAILY 90 tablet 1    fluticasone (FLONASE) 50 MCG/ACT nasal spray 2 sprays into the nostril(s) as directed by provider Daily. 15.8 mL 2    tadalafil (CIALIS) 5 MG tablet Take 1 tablet by mouth Daily.      tamsulosin (FLOMAX) 0.4 MG capsule 24 hr capsule Take 1 capsule by mouth Daily.       No current facility-administered medications on file prior to visit.                Assessment and Plan     Diagnoses and all orders for this visit:    1. Annual physical exam (Primary)  -     Comprehensive Metabolic Panel  -     Hemoglobin A1c  -     Urinalysis With Microscopic If Indicated (No Culture) - Urine, Clean Catch  -     Lipid Panel With / Chol / HDL Ratio  -     TSH Rfx On Abnormal To Free T4  -     CBC & Differential    2. Prediabetes  -     Comprehensive Metabolic Panel  -     Hemoglobin A1c  -     Urinalysis With Microscopic If Indicated (No Culture) - Urine, Clean Catch    3. Swallowing dysfunction  -     Ambulatory Referral to Gastroenterology    4. Essential hypertension        Patient Instructions   Continue medications as prescribed. Increase activity as tolerated. Stay  hydrated with water. Research Zepbound and Wegovy for weight loss and insurance coverage. Notify office of coverage and order to be sent for first dose at that time. Follow-up in 4 weeks after starting weight loss medication. Referral to gastroenterology placed. Scheduling center to call with appointment.            Follow Up     Return in about 4 weeks (around 7/3/2024) for Recheck.  Patient was given instructions and counseling regarding his condition or for health maintenance advice. Please see specific information pulled into the AVS if appropriate.

## 2024-06-06 NOTE — PROGRESS NOTES
Labs look great overall.  LDL (bad cholesterol) is slightly elevated 115.  Goal of 100 or lower.  This has improved since January 2023. Recommend limiting intake of red meat, pork, fried foods and high fat dairy products. Limit intake of alcohol. Recommend at least 30 minutes of heart elevating exercises three days a week as tolerated.     Hemoglobin A1c of 5.6 is within normal limits.  Continue to limit intake of sweets and carbohydrates.  Stay active to help maintain healthy glucose levels.    No signs of anemia, thyroid disease, liver injury or kidney injury.

## 2024-07-17 ENCOUNTER — TELEPHONE (OUTPATIENT)
Dept: INTERNAL MEDICINE | Facility: CLINIC | Age: 49
End: 2024-07-17
Payer: COMMERCIAL

## 2024-07-17 NOTE — TELEPHONE ENCOUNTER
Caller: Rivera Urias    Relationship: Self    Best call back number: [phone]    What was the call regarding: PATIENT WAS SEEN ON JUNE 5TH AND MADE AN APPOINTMENT FOR JULY 19TH. HE IS TRYING TO ASSESS IF HE NEEDS TO BE SEEN ON FRIDAY. HE WANTS TO MAKE SURE THIS IS BENEFICIAL. PATIENT STATES THAT HE RESEARCHED TWO MEDICATIONS WITH HIS INSURANCE COMPANY. AT THIS POINT, HE KNOWS WHAT HE WANTS TO TAKE. PATIENT STATES THAT HE HASN'T ORDERED THE MEDICATION AND HASN'T HAD FOUR WEEKS TO BE ON THE MEDICATION. THE DIRECTIONS STATE TO FOLLOW UP WITH PROVIDER IN FOUR WEEKS.     A PRIOR AUTHORIZATION WILL NEED TO BE DONE WITH [facility]UE. PATIENT WANTS TO GO AHEAD AND START THIS PROCESS, GET THE FIRST DOSE ORDERED AND THEN SCHEDULE FOLLOW UP FOR A FOUR WEEK CHECK.     PREFERRED PHARMACY IS: [facility] [id number] - [place], [place] - [address] [facility] AT [facility] [address] - [phone] [facility] [phone]  [phone]     PLEASE CALL PATIENT ASAP.

## 2024-07-18 DIAGNOSIS — E66.01 CLASS 3 SEVERE OBESITY DUE TO EXCESS CALORIES WITH SERIOUS COMORBIDITY AND BODY MASS INDEX (BMI) OF 45.0 TO 49.9 IN ADULT: Primary | ICD-10-CM

## 2024-07-22 ENCOUNTER — PRIOR AUTHORIZATION (OUTPATIENT)
Dept: INTERNAL MEDICINE | Facility: CLINIC | Age: 49
End: 2024-07-22
Payer: COMMERCIAL

## 2024-07-22 DIAGNOSIS — E66.01 CLASS 3 SEVERE OBESITY DUE TO EXCESS CALORIES WITH SERIOUS COMORBIDITY AND BODY MASS INDEX (BMI) OF 45.0 TO 49.9 IN ADULT: ICD-10-CM

## 2024-08-16 ENCOUNTER — OFFICE VISIT (OUTPATIENT)
Dept: INTERNAL MEDICINE | Facility: CLINIC | Age: 49
End: 2024-08-16
Payer: COMMERCIAL

## 2024-08-16 VITALS
DIASTOLIC BLOOD PRESSURE: 86 MMHG | HEART RATE: 76 BPM | TEMPERATURE: 98.2 F | RESPIRATION RATE: 18 BRPM | HEIGHT: 69 IN | SYSTOLIC BLOOD PRESSURE: 110 MMHG | WEIGHT: 286.8 LBS | OXYGEN SATURATION: 96 % | BODY MASS INDEX: 42.48 KG/M2

## 2024-08-16 DIAGNOSIS — E66.01 CLASS 3 SEVERE OBESITY DUE TO EXCESS CALORIES WITH SERIOUS COMORBIDITY AND BODY MASS INDEX (BMI) OF 45.0 TO 49.9 IN ADULT: Primary | ICD-10-CM

## 2024-08-16 PROCEDURE — 99213 OFFICE O/P EST LOW 20 MIN: CPT

## 2024-08-16 NOTE — PATIENT INSTRUCTIONS
Increase semaglutide to 0.5 mg weekly. Monitor for side effects. Stay hydrated with water.  Follow-up in 4 weeks for recheck.

## 2024-08-16 NOTE — PROGRESS NOTES
"Chief Complaint  Med Management (Follow up on Wegovy )    Subjective        Rivera Urias presents to [facility]  History of Present Illness  48-year-old male presenting for weight management.  Since starting medication is noticed that he is getting santana faster.  States that he has been more aware of eating and about the impulsivity of eating.  Is tolerating medication well overall.    Recently had his first kidney stone.  Since passing is concerned about kidney stones in the future.      Objective   Vital Signs:  /86 (BP Location: Left arm)   Pulse 76   Temp 98.2 °F (36.8 °C) (Oral)   Resp 18   Ht 175.3 cm (69\")   Wt 130 kg (286 lb 12.8 oz)   SpO2 96%   BMI 42.35 kg/m²   Estimated body mass index is 42.35 kg/m² as calculated from the following:    Height as of this encounter: 175.3 cm (69\").    Weight as of this encounter: 130 kg (286 lb 12.8 oz).               Physical Exam  Vitals reviewed.   Constitutional:       Appearance: Normal appearance.   HENT:      Head: Normocephalic.   Musculoskeletal:         General: Normal range of motion.      Cervical back: Normal range of motion.   Skin:     General: Skin is warm and dry.      Capillary Refill: Capillary refill takes less than 2 seconds.   Neurological:      General: No focal deficit present.      Mental Status: He is alert and oriented to person, place, and time.   Psychiatric:         Mood and Affect: Mood normal.         Behavior: Behavior normal.         Thought Content: Thought content normal.         Judgment: Judgment normal.        Result Review :      Common labs          6/5/2024    10:24 8/4/2024    22:58   Common Labs   Glucose 88     BUN 12     Creatinine 1.07     Sodium 143     Potassium 4.7     Chloride 106     Calcium 9.9     Total Protein 7.5     Albumin 4.5  4.3       Total Bilirubin 0.3  0.4       Alkaline Phosphatase 81  64       AST (SGOT) 20  18       ALT (SGPT) 22  16       WBC 7.81  13.9     "   Hemoglobin 14.5  14.1       Hematocrit 44.2  43.2       Platelets 235  197       Total Cholesterol 179     Triglycerides 76     HDL Cholesterol 50     LDL Cholesterol  115     Hemoglobin A1C 5.60        Details          This result is from an external source.                 Current Outpatient Medications on File Prior to Visit   Medication Sig Dispense Refill    amLODIPine (NORVASC) 5 MG tablet TAKE 1 TABLET BY MOUTH DAILY 90 tablet 1    fluticasone (FLONASE) 50 MCG/ACT nasal spray 2 sprays into the nostril(s) as directed by provider Daily. 15.8 mL 2    tadalafil (CIALIS) 5 MG tablet Take 1 tablet by mouth Daily.      tamsulosin (FLOMAX) 0.4 MG capsule 24 hr capsule Take 1 capsule by mouth Daily.       No current facility-administered medications on file prior to visit.                Assessment and Plan     Diagnoses and all orders for this visit:    1. Class 3 severe obesity due to excess calories with serious comorbidity and body mass index (BMI) of 45.0 to 49.9 in adult (Primary)  -     Semaglutide-Weight Management 0.5 MG/0.5ML solution auto-injector; Inject 0.5 mL under the skin into the appropriate area as directed 1 (One) Time Per Week.  Dispense: 2 mL; Refill: 0        Patient Instructions   Increase semaglutide to 0.5 mg weekly. Monitor for side effects. Stay hydrated with water.  Follow-up in 4 weeks for recheck.           Follow Up     Return in about 4 weeks (around 9/13/2024) for Recheck.  Patient was given instructions and counseling regarding his condition or for health maintenance advice. Please see specific information pulled into the AVS if appropriate.

## 2024-09-13 ENCOUNTER — OFFICE VISIT (OUTPATIENT)
Dept: INTERNAL MEDICINE | Facility: CLINIC | Age: 49
End: 2024-09-13
Payer: COMMERCIAL

## 2024-09-13 VITALS
HEIGHT: 69 IN | OXYGEN SATURATION: 96 % | SYSTOLIC BLOOD PRESSURE: 142 MMHG | RESPIRATION RATE: 20 BRPM | TEMPERATURE: 98.6 F | HEART RATE: 92 BPM | BODY MASS INDEX: 42.45 KG/M2 | DIASTOLIC BLOOD PRESSURE: 90 MMHG | WEIGHT: 286.6 LBS

## 2024-09-13 DIAGNOSIS — I10 ESSENTIAL HYPERTENSION: Chronic | ICD-10-CM

## 2024-09-13 DIAGNOSIS — E66.01 CLASS 3 SEVERE OBESITY DUE TO EXCESS CALORIES WITH SERIOUS COMORBIDITY AND BODY MASS INDEX (BMI) OF 45.0 TO 49.9 IN ADULT: Primary | ICD-10-CM

## 2024-09-13 PROCEDURE — 99213 OFFICE O/P EST LOW 20 MIN: CPT

## 2024-09-13 NOTE — PATIENT INSTRUCTIONS
Increase semaglutide to 1 mg weekly. Monitor for side effects. Monitor blood pressure occasionally at home. Follow-up in 4 weeks for recheck.

## 2024-09-13 NOTE — PROGRESS NOTES
"Chief Complaint  Med Management (Follow up on Wegovy )    Subjective        Rivera Urias presents to [facility] PRIMARY CARE  History of Present Illness  48-year-old male presenting for medication management.  Has been taking 0.5 mg semaglutide weekly and tolerating well.  Has not noticed any changes in weight.  Has making efforts to decrease quantity of foods eating and types of foods that he is eating.    Blood pressure slightly more elevated today.  Is taking amlodipine 5 mg daily.      Objective   Vital Signs:  /90 (BP Location: Left arm)   Pulse 92   Temp 98.6 °F (37 °C) (Oral)   Resp 20   Ht 175.3 cm (69\")   Wt 130 kg (286 lb 9.6 oz)   SpO2 96%   BMI 42.32 kg/m²   Estimated body mass index is 42.32 kg/m² as calculated from the following:    Height as of this encounter: 175.3 cm (69\").    Weight as of this encounter: 130 kg (286 lb 9.6 oz).               Physical Exam  Vitals reviewed.   Constitutional:       Appearance: Normal appearance.   Musculoskeletal:         General: Normal range of motion.      Cervical back: Normal range of motion.   Skin:     General: Skin is warm and dry.      Capillary Refill: Capillary refill takes less than 2 seconds.   Neurological:      General: No focal deficit present.      Mental Status: He is alert and oriented to person, place, and time.   Psychiatric:         Mood and Affect: Mood normal.         Behavior: Behavior normal.         Thought Content: Thought content normal.         Judgment: Judgment normal.        Result Review :      Common labs          6/5/2024    10:24 8/4/2024    22:58   Common Labs   Glucose 88     BUN 12     Creatinine 1.07     Sodium 143     Potassium 4.7     Chloride 106     Calcium 9.9     Total Protein 7.5     Albumin 4.5  4.3       Total Bilirubin 0.3  0.4       Alkaline Phosphatase 81  64       AST (SGOT) 20  18       ALT (SGPT) 22  16       WBC 7.81  13.9       Hemoglobin 14.5  14.1       Hematocrit 44.2  43.2    "    Platelets 235  197       Total Cholesterol 179     Triglycerides 76     HDL Cholesterol 50     LDL Cholesterol  115     Hemoglobin A1C 5.60        Details          This result is from an external source.                 Current Outpatient Medications on File Prior to Visit   Medication Sig Dispense Refill    amLODIPine (NORVASC) 5 MG tablet TAKE 1 TABLET BY MOUTH DAILY 90 tablet 1    fluticasone (FLONASE) 50 MCG/ACT nasal spray 2 sprays into the nostril(s) as directed by provider Daily. 15.8 mL 2    tadalafil (CIALIS) 5 MG tablet Take 1 tablet by mouth Daily.      tamsulosin (FLOMAX) 0.4 MG capsule 24 hr capsule Take 1 capsule by mouth Daily.      [DISCONTINUED] Semaglutide-Weight Management 0.5 MG/0.5ML solution auto-injector Inject 0.5 mL under the skin into the appropriate area as directed 1 (One) Time Per Week. 2 mL 0     No current facility-administered medications on file prior to visit.                Assessment and Plan     Diagnoses and all orders for this visit:    1. Class 3 severe obesity due to excess calories with serious comorbidity and body mass index (BMI) of 45.0 to 49.9 in adult (Primary)  -     Semaglutide-Weight Management 1 MG/0.5ML solution auto-injector; Inject 0.5 mL under the skin into the appropriate area as directed 1 (One) Time Per Week.  Dispense: 2 mL; Refill: 0    2. Essential hypertension        Patient Instructions   Increase semaglutide to 1 mg weekly. Monitor for side effects. Monitor blood pressure occasionally at home. Follow-up in 4 weeks for recheck.           Follow Up     Return in about 4 weeks (around 10/11/2024) for Recheck.  Patient was given instructions and counseling regarding his condition or for health maintenance advice. Please see specific information pulled into the AVS if appropriate.

## 2024-09-20 ENCOUNTER — TELEPHONE (OUTPATIENT)
Dept: INTERNAL MEDICINE | Facility: CLINIC | Age: 49
End: 2024-09-20

## 2024-09-20 DIAGNOSIS — E66.813 CLASS 3 SEVERE OBESITY DUE TO EXCESS CALORIES WITH SERIOUS COMORBIDITY AND BODY MASS INDEX (BMI) OF 45.0 TO 49.9 IN ADULT: ICD-10-CM

## 2024-09-20 DIAGNOSIS — E66.01 CLASS 3 SEVERE OBESITY DUE TO EXCESS CALORIES WITH SERIOUS COMORBIDITY AND BODY MASS INDEX (BMI) OF 45.0 TO 49.9 IN ADULT: ICD-10-CM

## 2024-09-20 NOTE — TELEPHONE ENCOUNTER
Caller: Rivera Urias    Relationship: Self    Best call back number: [phone]       What was the call regarding: HIS REGULAR PHARMACY HAS WEGOVY BACKORDERED AND PATIENT IS REQUESTED THE RX TO BE MOVED TO [facility] BELOW       Semaglutide-Weight Management 1 MG/0.5ML solution auto-injector         [id number] - [place], [place] - [address]  - [phone]  - [phone]  [phone]

## 2024-10-18 ENCOUNTER — OFFICE VISIT (OUTPATIENT)
Dept: INTERNAL MEDICINE | Facility: CLINIC | Age: 49
End: 2024-10-18
Payer: COMMERCIAL

## 2024-10-18 VITALS
TEMPERATURE: 97.8 F | BODY MASS INDEX: 41.03 KG/M2 | OXYGEN SATURATION: 97 % | HEART RATE: 87 BPM | SYSTOLIC BLOOD PRESSURE: 129 MMHG | WEIGHT: 277 LBS | DIASTOLIC BLOOD PRESSURE: 84 MMHG | HEIGHT: 69 IN

## 2024-10-18 DIAGNOSIS — I10 ESSENTIAL HYPERTENSION: Chronic | ICD-10-CM

## 2024-10-18 DIAGNOSIS — E66.813 CLASS 3 SEVERE OBESITY DUE TO EXCESS CALORIES WITH SERIOUS COMORBIDITY AND BODY MASS INDEX (BMI) OF 45.0 TO 49.9 IN ADULT: Primary | ICD-10-CM

## 2024-10-18 DIAGNOSIS — E66.01 CLASS 3 SEVERE OBESITY DUE TO EXCESS CALORIES WITH SERIOUS COMORBIDITY AND BODY MASS INDEX (BMI) OF 45.0 TO 49.9 IN ADULT: Primary | ICD-10-CM

## 2024-10-18 PROCEDURE — 99213 OFFICE O/P EST LOW 20 MIN: CPT

## 2024-10-18 NOTE — PROGRESS NOTES
"Chief Complaint  Follow-up (Meds starting to work)    Subjective        Rivera Urias presents to [facility]ARE  History of Present Illness  48-year-old male presenting for weight management check and.  Has been taking semaglutide 1 mg weekly.  Tolerating well.  States that he has significantly noticed feeling full sooner.  Denies any nausea, constipation.  Has had some slight uneasiness in his stomach and treats with Pepto-Bismol if needed.  Has noticed his clothes fitting better.      Objective   Vital Signs:  /84 (BP Location: Left arm, Patient Position: Sitting, Cuff Size: Large Adult)   Pulse 87   Temp 97.8 °F (36.6 °C) (Temporal)   Ht 175.3 cm (69\")   Wt 126 kg (277 lb)   SpO2 97%   BMI 40.91 kg/m²   Estimated body mass index is 40.91 kg/m² as calculated from the following:    Height as of this encounter: 175.3 cm (69\").    Weight as of this encounter: 126 kg (277 lb).               Physical Exam  Vitals reviewed.   Constitutional:       Appearance: Normal appearance.   Musculoskeletal:         General: Normal range of motion.      Cervical back: Normal range of motion.   Skin:     General: Skin is warm and dry.      Capillary Refill: Capillary refill takes less than 2 seconds.   Neurological:      General: No focal deficit present.      Mental Status: He is alert and oriented to person, place, and time.   Psychiatric:         Mood and Affect: Mood normal.         Behavior: Behavior normal.         Thought Content: Thought content normal.         Judgment: Judgment normal.        Result Review :      Common labs          6/5/2024    10:24 8/4/2024    22:58   Common Labs   Glucose 88     BUN 12     Creatinine 1.07     Sodium 143     Potassium 4.7     Chloride 106     Calcium 9.9     Total Protein 7.5     Albumin 4.5  4.3       Total Bilirubin 0.3  0.4       Alkaline Phosphatase 81  64       AST (SGOT) 20  18       ALT (SGPT) 22  16       WBC 7.81  13.9       Hemoglobin " 14.5  14.1       Hematocrit 44.2  43.2       Platelets 235  197       Total Cholesterol 179     Triglycerides 76     HDL Cholesterol 50     LDL Cholesterol  115     Hemoglobin A1C 5.60        Details          This result is from an external source.                 Current Outpatient Medications on File Prior to Visit   Medication Sig Dispense Refill    amLODIPine (NORVASC) 5 MG tablet TAKE 1 TABLET BY MOUTH DAILY 90 tablet 1    fluticasone (FLONASE) 50 MCG/ACT nasal spray 2 sprays into the nostril(s) as directed by provider Daily. 15.8 mL 2    tadalafil (CIALIS) 5 MG tablet Take 1 tablet by mouth Daily.      tamsulosin (FLOMAX) 0.4 MG capsule 24 hr capsule Take 1 capsule by mouth Daily.      [DISCONTINUED] Semaglutide-Weight Management 1 MG/0.5ML solution auto-injector Inject 0.5 mL under the skin into the appropriate area as directed 1 (One) Time Per Week. 2 mL 0     No current facility-administered medications on file prior to visit.                Assessment and Plan     Diagnoses and all orders for this visit:    1. Class 3 severe obesity due to excess calories with serious comorbidity and body mass index (BMI) of 45.0 to 49.9 in adult (Primary)  -     Semaglutide-Weight Management 1.7 MG/0.75ML solution auto-injector; Inject 0.75 mL under the skin into the appropriate area as directed 1 (One) Time Per Week.  Dispense: 3 mL; Refill: 0    2. Essential hypertension        Patient Instructions   Take 1.7 mg semaglutide weekly. Monitor for tolerance. Notify office if decreased dose is desired. Follow-up in 4 weeks for recheck.            Follow Up     Return in about 4 weeks (around 11/15/2024) for Recheck.  Patient was given instructions and counseling regarding his condition or for health maintenance advice. Please see specific information pulled into the AVS if appropriate.

## 2024-10-18 NOTE — PATIENT INSTRUCTIONS
Take 1.7 mg semaglutide weekly. Monitor for tolerance. Notify office if decreased dose is desired. Follow-up in 4 weeks for recheck.

## 2024-10-24 ENCOUNTER — TELEMEDICINE (OUTPATIENT)
Dept: FAMILY MEDICINE CLINIC | Facility: TELEHEALTH | Age: 49
End: 2024-10-24
Payer: COMMERCIAL

## 2024-10-24 DIAGNOSIS — J01.00 ACUTE NON-RECURRENT MAXILLARY SINUSITIS: Primary | ICD-10-CM

## 2024-10-24 NOTE — PROGRESS NOTES
Subjective   Rivera Urias is a 48 y.o. male.     History of Present Illness  He has had congestion x 1 week, headache, sore throat. He has yellow/green nasal drainage. He symptoms have not improved. He has been taking advil cold and flu, xicam, flonase.  He has not been on antibiotics in the past month.        The following portions of the patient's history were reviewed and updated as appropriate: allergies, current medications, past family history, past medical history, past social history, past surgical history, and problem list.    Review of Systems   Constitutional:  Negative for fever.   HENT:  Positive for congestion, sinus pressure and sneezing.    Respiratory:  Positive for cough. Negative for chest tightness, shortness of breath and wheezing.        Objective   Physical Exam  Constitutional:       General: He is not in acute distress.     Appearance: He is well-developed. He is not diaphoretic.   HENT:      Nose:      Right Sinus: Maxillary sinus tenderness present.      Left Sinus: Maxillary sinus tenderness present.      Comments: R>L  Pulmonary:      Effort: Pulmonary effort is normal.   Neurological:      Mental Status: He is alert and oriented to person, place, and time.   Psychiatric:         Behavior: Behavior normal.           Assessment & Plan   Diagnoses and all orders for this visit:    1. Acute non-recurrent maxillary sinusitis (Primary)  -     amoxicillin-clavulanate (AUGMENTIN) 875-125 MG per tablet; Take 1 tablet by mouth 2 (Two) Times a Day for 10 days.  Dispense: 20 tablet; Refill: 0                 The use of a video visit has been reviewed with the patient and verbal informed consent has been obtained. Myself and Rivera Urias participated in this visit. The patient is located in [place], [place]. I am located in [place], [place]. [employer] and [employer] Client were utilized. I spent 10 minutes in the patient's chart for this visit.

## 2024-11-15 ENCOUNTER — OFFICE VISIT (OUTPATIENT)
Dept: INTERNAL MEDICINE | Facility: CLINIC | Age: 49
End: 2024-11-15
Payer: COMMERCIAL

## 2024-11-15 VITALS
TEMPERATURE: 98 F | WEIGHT: 278 LBS | HEART RATE: 85 BPM | BODY MASS INDEX: 41.18 KG/M2 | SYSTOLIC BLOOD PRESSURE: 127 MMHG | HEIGHT: 69 IN | OXYGEN SATURATION: 96 % | DIASTOLIC BLOOD PRESSURE: 86 MMHG

## 2024-11-15 DIAGNOSIS — I10 ESSENTIAL HYPERTENSION: ICD-10-CM

## 2024-11-15 DIAGNOSIS — E66.01 CLASS 3 SEVERE OBESITY DUE TO EXCESS CALORIES WITH SERIOUS COMORBIDITY AND BODY MASS INDEX (BMI) OF 45.0 TO 49.9 IN ADULT: Primary | ICD-10-CM

## 2024-11-15 DIAGNOSIS — E78.5 HYPERLIPIDEMIA, UNSPECIFIED HYPERLIPIDEMIA TYPE: ICD-10-CM

## 2024-11-15 DIAGNOSIS — E66.813 CLASS 3 SEVERE OBESITY DUE TO EXCESS CALORIES WITH SERIOUS COMORBIDITY AND BODY MASS INDEX (BMI) OF 45.0 TO 49.9 IN ADULT: Primary | ICD-10-CM

## 2024-11-15 LAB
ALBUMIN SERPL-MCNC: 4.3 G/DL (ref 3.5–5.2)
ALBUMIN/GLOB SERPL: 1.4 G/DL
ALP SERPL-CCNC: 87 U/L (ref 39–117)
ALT SERPL-CCNC: 26 U/L (ref 1–41)
APPEARANCE UR: CLEAR
AST SERPL-CCNC: 22 U/L (ref 1–40)
BACTERIA #/AREA URNS HPF: NORMAL /HPF
BILIRUB SERPL-MCNC: 0.3 MG/DL (ref 0–1.2)
BILIRUB UR QL STRIP: NEGATIVE
BUN SERPL-MCNC: 16 MG/DL (ref 6–20)
BUN/CREAT SERPL: 15.4 (ref 7–25)
CALCIUM SERPL-MCNC: 9.7 MG/DL (ref 8.6–10.5)
CASTS URNS MICRO: NORMAL
CHLORIDE SERPL-SCNC: 104 MMOL/L (ref 98–107)
CHOLEST SERPL-MCNC: 173 MG/DL (ref 0–200)
CHOLEST/HDLC SERPL: 3.6 {RATIO}
CO2 SERPL-SCNC: 28.7 MMOL/L (ref 22–29)
COLOR UR: YELLOW
CREAT SERPL-MCNC: 1.04 MG/DL (ref 0.76–1.27)
EGFRCR SERPLBLD CKD-EPI 2021: 88.6 ML/MIN/1.73
EPI CELLS #/AREA URNS HPF: NORMAL /HPF
GLOBULIN SER CALC-MCNC: 3.1 GM/DL
GLUCOSE SERPL-MCNC: 98 MG/DL (ref 65–99)
GLUCOSE UR QL STRIP: NEGATIVE
HDLC SERPL-MCNC: 48 MG/DL (ref 40–60)
HGB UR QL STRIP: ABNORMAL
KETONES UR QL STRIP: NEGATIVE
LDLC SERPL CALC-MCNC: 109 MG/DL (ref 0–100)
LEUKOCYTE ESTERASE UR QL STRIP: NEGATIVE
NITRITE UR QL STRIP: NEGATIVE
PH UR STRIP: 6 [PH] (ref 5–8)
POTASSIUM SERPL-SCNC: 4.6 MMOL/L (ref 3.5–5.2)
PROT SERPL-MCNC: 7.4 G/DL (ref 6–8.5)
PROT UR QL STRIP: NEGATIVE
RBC #/AREA URNS HPF: NORMAL /HPF
SODIUM SERPL-SCNC: 139 MMOL/L (ref 136–145)
SP GR UR STRIP: 1.03 (ref 1–1.03)
TRIGL SERPL-MCNC: 84 MG/DL (ref 0–150)
UROBILINOGEN UR STRIP-MCNC: ABNORMAL MG/DL
VLDLC SERPL CALC-MCNC: 16 MG/DL (ref 5–40)
WBC #/AREA URNS HPF: NORMAL /HPF

## 2024-11-15 PROCEDURE — 99213 OFFICE O/P EST LOW 20 MIN: CPT

## 2024-11-15 NOTE — PROGRESS NOTES
"Chief Complaint  Follow-up    Subjective        Rivera Urias presents to [facility]IMARY [facility]ARE  History of Present Illness  49-year-old male presenting for weight management and hyperlipidemia.  Is taking 1.7 mg Wegovy weekly.  Tolerating well.  Has not noticed pounds going off but clothes are fitting better.  Has noticed significant decrease in how much food intake he can tolerate at a time.      Follow-up      Objective   Vital Signs:  /86 (BP Location: Left arm, Patient Position: Sitting, Cuff Size: Adult)   Pulse 85   Temp 98 °F (36.7 °C) (Temporal)   Ht 175.3 cm (69\")   Wt 126 kg (278 lb)   SpO2 96%   BMI 41.05 kg/m²   Estimated body mass index is 41.05 kg/m² as calculated from the following:    Height as of this encounter: 175.3 cm (69\").    Weight as of this encounter: 126 kg (278 lb).               Physical Exam  Vitals reviewed.   Constitutional:       Appearance: Normal appearance.   Musculoskeletal:         General: Normal range of motion.      Cervical back: Normal range of motion.   Skin:     General: Skin is warm and dry.      Capillary Refill: Capillary refill takes less than 2 seconds.   Neurological:      General: No focal deficit present.      Mental Status: He is alert and oriented to person, place, and time.   Psychiatric:         Mood and Affect: Mood normal.         Behavior: Behavior normal.         Thought Content: Thought content normal.         Judgment: Judgment normal.        Result Review :      Common labs          6/5/2024    10:24 8/4/2024    22:58   Common Labs   Glucose 88     BUN 12     Creatinine 1.07     Sodium 143     Potassium 4.7     Chloride 106     Calcium 9.9     Total Protein 7.5     Albumin 4.5  4.3       Total Bilirubin 0.3  0.4       Alkaline Phosphatase 81  64       AST (SGOT) 20  18       ALT (SGPT) 22  16       WBC 7.81  13.9       Hemoglobin 14.5  14.1       Hematocrit 44.2  43.2       Platelets 235  197       Total Cholesterol 179   "   Triglycerides 76     HDL Cholesterol 50     LDL Cholesterol  115     Hemoglobin A1C 5.60        Details          This result is from an external source.                 Current Outpatient Medications on File Prior to Visit   Medication Sig Dispense Refill    amLODIPine (NORVASC) 5 MG tablet TAKE 1 TABLET BY MOUTH DAILY 90 tablet 1    fluticasone (FLONASE) 50 MCG/ACT nasal spray 2 sprays into the nostril(s) as directed by provider Daily. 15.8 mL 2    tadalafil (CIALIS) 5 MG tablet Take 1 tablet by mouth Daily.      tamsulosin (FLOMAX) 0.4 MG capsule 24 hr capsule Take 1 capsule by mouth Daily.      [DISCONTINUED] Semaglutide-Weight Management 1.7 MG/0.75ML solution auto-injector Inject 0.75 mL under the skin into the appropriate area as directed 1 (One) Time Per Week. 3 mL 0     No current facility-administered medications on file prior to visit.                Assessment and Plan     Diagnoses and all orders for this visit:    1. Class 3 severe obesity due to excess calories with serious comorbidity and body mass index (BMI) of 45.0 to 49.9 in adult (Primary)  -     Semaglutide-Weight Management 2.4 MG/0.75ML solution auto-injector; Inject 2.4 mg under the skin into the appropriate area as directed 1 (One) Time Per Week.  Dispense: 3 mL; Refill: 2    2. Essential hypertension  -     Comprehensive Metabolic Panel  -     Urinalysis With Microscopic If Indicated (No Culture) - Urine, Clean Catch    3. Hyperlipidemia, unspecified hyperlipidemia type  -     Lipid Panel With / Chol / HDL Ratio        Patient Instructions   Increase 2.4 mg semaglutide weekly. Stay active. Stay hydrated with water. Labs today. Follow-up in 7 months for annual and fasting labs.            Follow Up     Return in about 7 months (around 6/15/2025) for Annual physical.  Patient was given instructions and counseling regarding his condition or for health maintenance advice. Please see specific information pulled into the AVS if appropriate.

## 2024-11-15 NOTE — PATIENT INSTRUCTIONS
Increase 2.4 mg semaglutide weekly. Stay active. Stay hydrated with water. Labs today. Follow-up in 7 months for annual and fasting labs.

## 2024-11-18 RX ORDER — AMLODIPINE BESYLATE 5 MG/1
5 TABLET ORAL DAILY
Qty: 90 TABLET | Refills: 1 | Status: SHIPPED | OUTPATIENT
Start: 2024-11-18

## 2024-11-18 NOTE — PROGRESS NOTES
Metabolic panel is normal.  No signs of diabetes, kidney injury or liver injury.    Cholesterol levels have improved slightly in the past 5 months.    Urinalysis is unremarkable.    Will continue to monitor labs in the future.  Continue medications as prescribed.

## 2025-01-20 ENCOUNTER — TELEPHONE (OUTPATIENT)
Dept: INTERNAL MEDICINE | Facility: CLINIC | Age: 50
End: 2025-01-20
Payer: COMMERCIAL

## 2025-01-20 NOTE — TELEPHONE ENCOUNTER
Caller: Rivera Urias    Relationship to patient: Self      Best call back number: [phone]     Provider: LING ARAIZA     Medication PA needed:     Semaglutide-Weight Management 2.4 MG/0.75ML solution auto-injector          Reason for call/Prior Auth: INSURANCE IS NEEDING ADDITIONAL INFORMATION TO BE ABLE TO FILL MEDICATION.

## 2025-01-29 ENCOUNTER — TELEPHONE (OUTPATIENT)
Dept: INTERNAL MEDICINE | Facility: CLINIC | Age: 50
End: 2025-01-29

## 2025-01-29 NOTE — TELEPHONE ENCOUNTER
Caller: Rivera Urias    Relationship: Self    Best call back number: [phone]    What is the best time to reach you: ANYTIME     Who are you requesting to speak with (clinical staff, provider,  specific staff member): CLINICAL     What was the call regarding: PATIENT STATES THAT HIS INSURANCE COMPANY IS TELLING HIM THAT HIS WEGOVY PRESCRIPTION IS NEEDING A MEMBER TIER FORM COMPLETED ALONG WITH AN UPDATED PRIOR AUTHORIZATION TO GET THE MEDICATION BACK TO THE ORIGINAL PRICE, OR HE WOULD LIKE TO DISCUSS OTHER OPTIONS.     PATIENT STATES THAT HE WILL BE FAXING THE FORM TO THE OFFICE IF HIS PCP WOULD LIKE TO PROCEED WITH THE TIER FORM.     PLEASE ADVISE

## 2025-01-31 ENCOUNTER — PRIOR AUTHORIZATION (OUTPATIENT)
Dept: INTERNAL MEDICINE | Facility: CLINIC | Age: 50
End: 2025-01-31
Payer: COMMERCIAL

## 2025-02-03 ENCOUNTER — PRIOR AUTHORIZATION (OUTPATIENT)
Dept: INTERNAL MEDICINE | Facility: CLINIC | Age: 50
End: 2025-02-03
Payer: COMMERCIAL

## 2025-02-19 ENCOUNTER — TELEPHONE (OUTPATIENT)
Dept: INTERNAL MEDICINE | Facility: CLINIC | Age: 50
End: 2025-02-19
Payer: COMMERCIAL

## 2025-02-19 DIAGNOSIS — E66.01 CLASS 3 SEVERE OBESITY DUE TO EXCESS CALORIES WITH SERIOUS COMORBIDITY AND BODY MASS INDEX (BMI) OF 45.0 TO 49.9 IN ADULT: Primary | ICD-10-CM

## 2025-02-19 DIAGNOSIS — E66.813 CLASS 3 SEVERE OBESITY DUE TO EXCESS CALORIES WITH SERIOUS COMORBIDITY AND BODY MASS INDEX (BMI) OF 45.0 TO 49.9 IN ADULT: Primary | ICD-10-CM

## 2025-02-20 NOTE — TELEPHONE ENCOUNTER
Please notify patient that Wegovy is denied because his insurance wants him to participate in a comprehensive weight management program prior to authorizing. I will send a referral for our weight management clinic to assist with this process if he agrees.

## 2025-04-30 ENCOUNTER — CONSULT (OUTPATIENT)
Dept: BARIATRICS/WEIGHT MGMT | Facility: CLINIC | Age: 50
End: 2025-04-30
Payer: COMMERCIAL

## 2025-04-30 VITALS
HEIGHT: 69 IN | WEIGHT: 285 LBS | TEMPERATURE: 97.8 F | HEART RATE: 74 BPM | SYSTOLIC BLOOD PRESSURE: 147 MMHG | BODY MASS INDEX: 42.21 KG/M2 | DIASTOLIC BLOOD PRESSURE: 87 MMHG

## 2025-04-30 DIAGNOSIS — R73.02 IMPAIRED GLUCOSE TOLERANCE: Primary | Chronic | ICD-10-CM

## 2025-04-30 DIAGNOSIS — E66.813 CLASS 3 SEVERE OBESITY DUE TO EXCESS CALORIES WITH SERIOUS COMORBIDITY AND BODY MASS INDEX (BMI) OF 45.0 TO 49.9 IN ADULT: Chronic | ICD-10-CM

## 2025-04-30 DIAGNOSIS — E66.01 CLASS 3 SEVERE OBESITY DUE TO EXCESS CALORIES WITH SERIOUS COMORBIDITY AND BODY MASS INDEX (BMI) OF 45.0 TO 49.9 IN ADULT: Chronic | ICD-10-CM

## 2025-04-30 DIAGNOSIS — R73.03 PREDIABETES: Chronic | ICD-10-CM

## 2025-04-30 RX ORDER — SEMAGLUTIDE 0.5 MG/.5ML
0.5 INJECTION, SOLUTION SUBCUTANEOUS WEEKLY
Qty: 2 ML | Refills: 0 | Status: SHIPPED | OUTPATIENT
Start: 2025-04-30 | End: 2025-05-28

## 2025-04-30 NOTE — PROGRESS NOTES
"Chief Complaint  Consult (Consult [facility])    Karsten Urias presents to [facility]IAT[facility]  History of Present Illness  Patient presents today for medical weight loss consult--patient is current weighs 285 pounds with a BMI of 42.1--patient was previously on Wegovy last year, he took it for approximately 4 months and lost around 20 pounds--his insurance changed and he has been mandated to attend a weight loss practice before he can get the medication covered.    Patient stated that he did well with the medication previously.  He did not have nausea or vomiting, he did not have dysphagia or severe reflux.      We reviewed his diet and talked about appropriate food choices.  Patient has several teenage sons who play sports and he did indicate it is challenging to try to eat well.    We also talked about surgery as a potential option.  I diagrammed out a sleeve gastrectomy for the patient.  Initially his feelings were that he did not want surgery but appreciated knowing what options were out there for him.  Objective   Vital Signs:  /87 (BP Location: Right arm, Patient Position: Sitting, Cuff Size: Large Adult)   Pulse 74   Temp 97.8 °F (36.6 °C) (Infrared)   Ht 175.3 cm (69\")   Wt 129 kg (285 lb)   BMI 42.09 kg/m²   Estimated body mass index is 42.09 kg/m² as calculated from the following:    Height as of this encounter: 175.3 cm (69\").    Weight as of this encounter: 129 kg (285 lb).               Physical Exam   Alert, pleasant  No respiratory distress  Abdomen soft  Result Review :                   Assessment and Plan   Diagnoses and all orders for this visit:    1. Impaired glucose tolerance (Primary)    2. Class 3 severe obesity due to excess calories with serious comorbidity and body mass index (BMI) of 45.0 to 49.9 in adult    3. Prediabetes    Will start patient on Wegovy.  Will schedule the patient with our dietitian.  Plan to see the patient back in 1 " month to see how he is doing on the medication and answer any questions.       I spent 45 minutes caring for Rivera on this date of service. This time includes time spent by me in the following activities:preparing for the visit, reviewing tests, obtaining and/or reviewing a separately obtained history, performing a medically appropriate examination and/or evaluation , counseling and educating the patient/family/caregiver, documenting information in the medical record, and independently interpreting results and communicating that information with the patient/family/caregiver  Follow Up   No follow-ups on file.  Patient was given instructions and counseling regarding his condition or for health maintenance advice. Please see specific information pulled into the AVS if appropriate.

## 2025-05-06 ENCOUNTER — TELEPHONE (OUTPATIENT)
Dept: BARIATRICS/WEIGHT MGMT | Facility: CLINIC | Age: 50
End: 2025-05-06
Payer: COMMERCIAL

## 2025-05-06 NOTE — TELEPHONE ENCOUNTER
INS APPROVED. EXP 11/02/2025.    SPOKE TO PHARMACIST AT [facility]  [phone], WHERE PATIENTS PRESCRIPTION IS AND PT COST IS $770.29. PER PHARMACIST PT CAN GO TO [employer] WEBSITE AND DOWNLOAD SAVINGS CARD WHICH .00 SAVINGS TO HELP REDUCE COST TO $545.29 PER MONTH.    LVM FOR PT TO CALL ME BACK

## 2025-05-06 NOTE — TELEPHONE ENCOUNTER
PT CALLED ME BACK. MADE HIM AWARE OF THE COST AND HE ASKED IF WE CAN DO A TIER EXCEPTION FORM REQUEST.    EXPLAINED TO PT THAT I WOULD LET DR ROA KNOW AND WE WOULD DO THE FORM FOR HIM.    DR CRISPIN COVARRUBIAS COST TOO MUCH AND WE WILL DO A TIER EXCEPTION FORM FOR PT WITH [employer]. THANKS

## 2025-05-12 DIAGNOSIS — I10 ESSENTIAL HYPERTENSION: ICD-10-CM

## 2025-05-12 RX ORDER — AMLODIPINE BESYLATE 5 MG/1
5 TABLET ORAL DAILY
Qty: 90 TABLET | Refills: 1 | Status: SHIPPED | OUTPATIENT
Start: 2025-05-12

## 2025-05-20 ENCOUNTER — OFFICE VISIT (OUTPATIENT)
Dept: BARIATRICS/WEIGHT MGMT | Facility: CLINIC | Age: 50
End: 2025-05-20
Payer: COMMERCIAL

## 2025-05-20 DIAGNOSIS — E66.813 OBESITY, CLASS III, BMI 40-49.9 (MORBID OBESITY): Primary | ICD-10-CM

## 2025-05-20 DIAGNOSIS — R73.03 PREDIABETES: ICD-10-CM

## 2025-05-20 DIAGNOSIS — R73.02 IMPAIRED GLUCOSE TOLERANCE: ICD-10-CM

## 2025-05-20 DIAGNOSIS — Z71.3 DIETARY COUNSELING: ICD-10-CM

## 2025-05-20 NOTE — PROGRESS NOTES
"Medical Weight Loss Nutrition Assessment    Patient Name: Rivera Urias    YOB: 1975   Age: 49 y.o.  Sex: male  MRN: [id number]     ASSESSMENT    Anthropometric Measurements  Estimated body mass index is 42.09 kg/m² as calculated from the following:    Height as of 4/30/25: 175.3 cm (69\").    Weight as of 4/30/25: 129 kg (285 lb).    Medical History  Past Medical History:   Diagnosis Date    Allergic Childhood    Hypertension     Obesity     Sleep apnea      Past Surgical History:   Procedure Laterality Date    APPENDECTOMY         Patient Goals  Weight loss     Visit Narrative  Rivera Urias is participating in medical weight loss program under the supervision of a medical specialist and registered dietitian. Spoke with patient today for nutrition consult. Successful with HMR short term. Worked well with busy lifestyle but unsustainable. Has been on Wegovy in the past and did well.     Estimated Nutrition Requirements  BMR (using Burnett-St. Jeor equation): 2153 calories per day  TDEE (using activity factor 1.2): 2584 calories per day   Daily protein needs:  grams per day     DIAGNOSIS     Nutrition problem statement: Obesity related to multifactorial biochemical, behavioral and environmental contributors to disease as evidenced by BMI 42.09 kg/m²    INTERVENTION    Nutrition education and coaching for behavior change completed. Educational materials provided. Reviewed the appropriate dietary choices with the patient and provided guidance and suggestions for making necessary changes. Discussed sustainable habit changes and setting small, achievable goals that can be maintained long term. Recommended focus on eating protein first, followed by vegetables/fruits/fiber rich foods. Weigh/measure portions sizes for high fat and calorie dense foods. Discussed portion plate model for guidelines on putting together balanced meals. Suggested the option of keeping a food journal which will help patient " become more aware of the nutritional value of food, establish starting point and identify areas for improvement. Aim for at least 64 oz water daily. Be sure to do routine exercise, work up to 150 minutes per week, including both cardio and strength training. Dietitian to provide ongoing nutrition education and counseling to establish sustainable meal patterns and support overall health.    MONITORING & EVALUATION    Goals/Plan:   Begin to measure portion sizes and track intake on paper or electronically.   Long discussion on strategies for making dietary changes that fit in lifestyle. There is no perfect diet for weight loss. Sample meal plans provided for ideas.      Recommendations for weight loss:   2000 calories   200-225 gm carbohydrate (40-45%)  125-150 gm protein (25-30%)  67 gm fat (30%)     Total time spent during this encounter today exceeded 60 minutes and includes preparing for the visit, reviewing tests, counseling and educating the patient/family/caregiver and documenting information in the medical record.    Electronically signed by:  Val Coleman RD   05/20/25 15:39 EDT

## 2025-05-21 DIAGNOSIS — E66.01 OBESITY, CLASS III, BMI 40-49.9 (MORBID OBESITY): Primary | ICD-10-CM

## 2025-06-20 ENCOUNTER — OFFICE VISIT (OUTPATIENT)
Dept: INTERNAL MEDICINE | Facility: CLINIC | Age: 50
End: 2025-06-20
Payer: COMMERCIAL

## 2025-06-20 VITALS
RESPIRATION RATE: 18 BRPM | SYSTOLIC BLOOD PRESSURE: 119 MMHG | HEART RATE: 72 BPM | WEIGHT: 285 LBS | HEIGHT: 69 IN | BODY MASS INDEX: 42.21 KG/M2 | DIASTOLIC BLOOD PRESSURE: 80 MMHG | OXYGEN SATURATION: 96 %

## 2025-06-20 DIAGNOSIS — Z00.00 ANNUAL PHYSICAL EXAM: ICD-10-CM

## 2025-06-20 DIAGNOSIS — E03.9 ACQUIRED HYPOTHYROIDISM: ICD-10-CM

## 2025-06-20 DIAGNOSIS — J35.8 TONSIL STONE: ICD-10-CM

## 2025-06-20 DIAGNOSIS — E66.813 CLASS 3 SEVERE OBESITY DUE TO EXCESS CALORIES WITH SERIOUS COMORBIDITY AND BODY MASS INDEX (BMI) OF 45.0 TO 49.9 IN ADULT: ICD-10-CM

## 2025-06-20 DIAGNOSIS — G47.10 HYPERSOMNIA WITH SLEEP APNEA: Primary | ICD-10-CM

## 2025-06-20 DIAGNOSIS — G47.30 HYPERSOMNIA WITH SLEEP APNEA: Primary | ICD-10-CM

## 2025-06-20 DIAGNOSIS — E66.01 CLASS 3 SEVERE OBESITY DUE TO EXCESS CALORIES WITH SERIOUS COMORBIDITY AND BODY MASS INDEX (BMI) OF 45.0 TO 49.9 IN ADULT: ICD-10-CM

## 2025-06-20 DIAGNOSIS — I10 ESSENTIAL HYPERTENSION: Chronic | ICD-10-CM

## 2025-06-20 DIAGNOSIS — Z76.89 ENCOUNTER FOR WEIGHT MANAGEMENT: ICD-10-CM

## 2025-06-20 LAB
ALBUMIN SERPL-MCNC: 4.3 G/DL (ref 3.5–5.2)
ALBUMIN/GLOB SERPL: 1.3 G/DL
ALP SERPL-CCNC: 89 U/L (ref 39–117)
ALT SERPL-CCNC: 29 U/L (ref 1–41)
APPEARANCE UR: CLEAR
AST SERPL-CCNC: 23 U/L (ref 1–40)
BASOPHILS # BLD AUTO: 0.06 10*3/MM3 (ref 0–0.2)
BASOPHILS NFR BLD AUTO: 0.6 % (ref 0–1.5)
BILIRUB SERPL-MCNC: 0.4 MG/DL (ref 0–1.2)
BILIRUB UR QL STRIP: NEGATIVE
BUN SERPL-MCNC: 15 MG/DL (ref 6–20)
BUN/CREAT SERPL: 14.4 (ref 7–25)
CALCIUM SERPL-MCNC: 9.5 MG/DL (ref 8.6–10.5)
CHLORIDE SERPL-SCNC: 102 MMOL/L (ref 98–107)
CHOLEST SERPL-MCNC: 179 MG/DL (ref 0–200)
CHOLEST/HDLC SERPL: 3.65 {RATIO}
CO2 SERPL-SCNC: 27 MMOL/L (ref 22–29)
COLOR UR: YELLOW
CREAT SERPL-MCNC: 1.04 MG/DL (ref 0.76–1.27)
EGFRCR SERPLBLD CKD-EPI 2021: 88.6 ML/MIN/1.73
EOSINOPHIL # BLD AUTO: 0.32 10*3/MM3 (ref 0–0.4)
EOSINOPHIL NFR BLD AUTO: 3.2 % (ref 0.3–6.2)
ERYTHROCYTE [DISTWIDTH] IN BLOOD BY AUTOMATED COUNT: 13.3 % (ref 12.3–15.4)
GLOBULIN SER CALC-MCNC: 3.2 GM/DL
GLUCOSE SERPL-MCNC: 89 MG/DL (ref 65–99)
GLUCOSE UR QL STRIP: NEGATIVE
HBA1C MFR BLD: 5.8 % (ref 4.8–5.6)
HCT VFR BLD AUTO: 44.1 % (ref 37.5–51)
HDLC SERPL-MCNC: 49 MG/DL (ref 40–60)
HGB BLD-MCNC: 14.5 G/DL (ref 13–17.7)
HGB UR QL STRIP: NEGATIVE
IMM GRANULOCYTES # BLD AUTO: 0.03 10*3/MM3 (ref 0–0.05)
IMM GRANULOCYTES NFR BLD AUTO: 0.3 % (ref 0–0.5)
KETONES UR QL STRIP: NEGATIVE
LDLC SERPL CALC-MCNC: 113 MG/DL (ref 0–100)
LEUKOCYTE ESTERASE UR QL STRIP: NEGATIVE
LYMPHOCYTES # BLD AUTO: 3.3 10*3/MM3 (ref 0.7–3.1)
LYMPHOCYTES NFR BLD AUTO: 33.2 % (ref 19.6–45.3)
MCH RBC QN AUTO: 28.4 PG (ref 26.6–33)
MCHC RBC AUTO-ENTMCNC: 32.9 G/DL (ref 31.5–35.7)
MCV RBC AUTO: 86.3 FL (ref 79–97)
MONOCYTES # BLD AUTO: 0.45 10*3/MM3 (ref 0.1–0.9)
MONOCYTES NFR BLD AUTO: 4.5 % (ref 5–12)
NEUTROPHILS # BLD AUTO: 5.77 10*3/MM3 (ref 1.7–7)
NEUTROPHILS NFR BLD AUTO: 58.2 % (ref 42.7–76)
NITRITE UR QL STRIP: NEGATIVE
NRBC BLD AUTO-RTO: 0 /100 WBC (ref 0–0.2)
PH UR STRIP: 6.5 [PH] (ref 5–8)
PLATELET # BLD AUTO: 218 10*3/MM3 (ref 140–450)
POTASSIUM SERPL-SCNC: 4.4 MMOL/L (ref 3.5–5.2)
PROT SERPL-MCNC: 7.5 G/DL (ref 6–8.5)
PROT UR QL STRIP: NEGATIVE
RBC # BLD AUTO: 5.11 10*6/MM3 (ref 4.14–5.8)
SODIUM SERPL-SCNC: 139 MMOL/L (ref 136–145)
SP GR UR STRIP: 1.01 (ref 1–1.03)
T4 FREE SERPL-MCNC: 1.06 NG/DL (ref 0.92–1.68)
TRIGL SERPL-MCNC: 91 MG/DL (ref 0–150)
TSH SERPL DL<=0.005 MIU/L-ACNC: 3.77 UIU/ML (ref 0.27–4.2)
UROBILINOGEN UR STRIP-MCNC: NORMAL MG/DL
VLDLC SERPL CALC-MCNC: 17 MG/DL (ref 5–40)
WBC # BLD AUTO: 9.93 10*3/MM3 (ref 3.4–10.8)

## 2025-06-20 NOTE — PROGRESS NOTES
"Chief Complaint  Annual Exam    Subjective        Rivera Urias presents to [facility]  History of Present Illness  49-year-old male presenting with sleep apnea, hypertension, weight management and annual exam.  Has been taking medications as prescribed and tolerating generally well.  Has had some slight swelling of his ankles but has not bothersome at this time.  Blood pressure is excellent today.    Was taking Wegovy last year and tolerating very well.  Since stopping due to insurance cost, weight has returned.    Patient has sleep apnea and wears CPAP nightly.  Last seen by sleep medicine in 2022 with Dr. Unger.  Will prescribe Zepbound for sleep apnea.    Denies chest pain, difficulty breathing, swelling of hands, constipation, dysuria and changes in vision or hearing.          Objective   Vital Signs:  /80 (BP Location: Left arm, Patient Position: Sitting, Cuff Size: Large Adult)   Pulse 72   Resp 18   Ht 175.3 cm (69\")   Wt 129 kg (285 lb)   SpO2 96%   BMI 42.09 kg/m²   Estimated body mass index is 42.09 kg/m² as calculated from the following:    Height as of this encounter: 175.3 cm (69\").    Weight as of this encounter: 129 kg (285 lb).               Physical Exam  Vitals reviewed.   Constitutional:       Appearance: Normal appearance. He is obese.   HENT:      Head: Normocephalic.      Right Ear: Tympanic membrane normal.      Left Ear: Tympanic membrane normal.      Nose: Nose normal.      Mouth/Throat:      Mouth: Mucous membranes are moist.      Pharynx: Oropharynx is clear.   Eyes:      Pupils: Pupils are equal, round, and reactive to light.   Cardiovascular:      Rate and Rhythm: Normal rate.      Pulses: Normal pulses.      Heart sounds: Normal heart sounds.   Pulmonary:      Effort: Pulmonary effort is normal.      Breath sounds: Normal breath sounds.   Abdominal:      General: Bowel sounds are normal.      Palpations: Abdomen is soft.   Musculoskeletal:   "       General: Normal range of motion.      Cervical back: Normal range of motion.   Skin:     General: Skin is warm and dry.      Capillary Refill: Capillary refill takes less than 2 seconds.   Neurological:      General: No focal deficit present.      Mental Status: He is alert and oriented to person, place, and time.   Psychiatric:         Mood and Affect: Mood normal.         Behavior: Behavior normal.         Thought Content: Thought content normal.         Judgment: Judgment normal.        Result Review :      Common labs          8/4/2024    22:58 11/15/2024    09:03   Common Labs   Glucose  98    BUN  16    Creatinine  1.04    Sodium  139    Potassium  4.6    Chloride  104    Calcium  9.7    Albumin 4.3     4.3    Total Bilirubin 0.4     0.3    Alkaline Phosphatase 64     87    AST (SGOT) 18     22    ALT (SGPT) 16     26    WBC 13.9        Hemoglobin 14.1        Hematocrit 43.2        Platelets 197        Total Cholesterol  173    Triglycerides  84    HDL Cholesterol  48    LDL Cholesterol   109       Details          This result is from an external source.                 Current Outpatient Medications on File Prior to Visit   Medication Sig Dispense Refill    amLODIPine (NORVASC) 5 MG tablet TAKE 1 TABLET BY MOUTH DAILY 90 tablet 1    fluticasone (FLONASE) 50 MCG/ACT nasal spray 2 sprays into the nostril(s) as directed by provider Daily. 15.8 mL 2    multivitamin with minerals (MULTIVITAMIN ADULT PO) Take 1 tablet by mouth Daily.      tadalafil (CIALIS) 5 MG tablet Take 1 tablet by mouth Daily.      tamsulosin (FLOMAX) 0.4 MG capsule 24 hr capsule Take 1 capsule by mouth Daily.      [DISCONTINUED] Semaglutide-Weight Management 2.4 MG/0.75ML solution auto-injector Inject 2.4 mg under the skin into the appropriate area as directed 1 (One) Time Per Week. (Patient not taking: Reported on 6/20/2025) 3 mL 2     No current facility-administered medications on file prior to visit.           ECG 12  Lead    Date/Time: 6/20/2025 9:02 AM  Performed by: Prakash Marr APRN    Authorized by: Prakash Marr APRN  Comparison: compared with previous ECG from 1/19/2023  Rhythm: sinus rhythm  Rate: normal  QRS axis: normal    Clinical impression: normal ECG            Assessment and Plan     Diagnoses and all orders for this visit:    1. Hypersomnia with sleep apnea (Primary)  -     Tirzepatide-Weight Management (ZEPBOUND) 2.5 MG/0.5ML solution auto-injector; Inject 0.5 mL under the skin into the appropriate area as directed 1 (One) Time Per Week.  Dispense: 2 mL; Refill: 0  -     Comprehensive Metabolic Panel  -     Hemoglobin A1c  -     Urinalysis With Microscopic If Indicated (No Culture) - Urine, Clean Catch  -     TSH  -     T4, Free  -     Lipid Panel With / Chol / HDL Ratio  -     CBC & Differential  -     Ambulatory Referral to Sleep Medicine    2. Class 3 severe obesity due to excess calories with serious comorbidity and body mass index (BMI) of 45.0 to 49.9 in adult  -     ECG 12 Lead  -     Comprehensive Metabolic Panel  -     Hemoglobin A1c  -     Urinalysis With Microscopic If Indicated (No Culture) - Urine, Clean Catch  -     TSH  -     T4, Free  -     Lipid Panel With / Chol / HDL Ratio  -     CBC & Differential    3. Annual physical exam  -     ECG 12 Lead  -     Comprehensive Metabolic Panel  -     Hemoglobin A1c  -     Urinalysis With Microscopic If Indicated (No Culture) - Urine, Clean Catch  -     TSH  -     T4, Free  -     Lipid Panel With / Chol / HDL Ratio  -     CBC & Differential    4. Encounter for weight management  -     ECG 12 Lead  -     Comprehensive Metabolic Panel  -     Hemoglobin A1c  -     Urinalysis With Microscopic If Indicated (No Culture) - Urine, Clean Catch  -     TSH  -     T4, Free  -     Lipid Panel With / Chol / HDL Ratio  -     CBC & Differential    5. Essential hypertension  -     Comprehensive Metabolic Panel  -     Hemoglobin A1c  -     Urinalysis With Microscopic If  Indicated (No Culture) - Urine, Clean Catch  -     TSH  -     T4, Free  -     Lipid Panel With / Chol / HDL Ratio  -     CBC & Differential    6. Acquired hypothyroidism  -     Comprehensive Metabolic Panel  -     Hemoglobin A1c  -     Urinalysis With Microscopic If Indicated (No Culture) - Urine, Clean Catch  -     TSH  -     T4, Free  -     Lipid Panel With / Chol / HDL Ratio  -     CBC & Differential    7. Tonsil stone        Patient Instructions   Take Zepbound as prescribed weekly. Follow-up in 4 weeks after starting medication. Stay hydrated with water. Referral placed for sleep medicine, Dr. Binh Unger. Contact office to schedule. Labs today. Follow-up in 6 months for recheck.     The patient was counseled regarding nutrition, physical activity, healthy weight, injury prevention, misuse of tobacco, alcohol and illicit drugs, mental health, immunizations, and screenings.         Follow Up     Return in about 4 weeks (around 7/18/2025) for Recheck after starting medication.  Patient was given instructions and counseling regarding his condition or for health maintenance advice. Please see specific information pulled into the AVS if appropriate.

## 2025-06-20 NOTE — PATIENT INSTRUCTIONS
Take Zepbound as prescribed weekly. Follow-up in 4 weeks after starting medication. Stay hydrated with water. Referral placed for sleep medicine, Dr. Binh Unger. Contact office to schedule. Labs today. Follow-up in 6 months for recheck.

## 2025-07-01 ENCOUNTER — PATIENT MESSAGE (OUTPATIENT)
Dept: INTERNAL MEDICINE | Facility: CLINIC | Age: 50
End: 2025-07-01
Payer: COMMERCIAL

## 2025-07-10 ENCOUNTER — OFFICE VISIT (OUTPATIENT)
Dept: SLEEP MEDICINE | Facility: HOSPITAL | Age: 50
End: 2025-07-10
Payer: COMMERCIAL

## 2025-07-10 VITALS — WEIGHT: 284 LBS | HEART RATE: 98 BPM | OXYGEN SATURATION: 94 % | BODY MASS INDEX: 42.06 KG/M2 | HEIGHT: 69 IN

## 2025-07-10 DIAGNOSIS — G47.33 OSA ON CPAP: ICD-10-CM

## 2025-07-10 DIAGNOSIS — G47.30 HYPERSOMNIA WITH SLEEP APNEA: Primary | ICD-10-CM

## 2025-07-10 DIAGNOSIS — G47.10 HYPERSOMNIA WITH SLEEP APNEA: Primary | ICD-10-CM

## 2025-07-10 DIAGNOSIS — G47.36 HYPOXEMIA ASSOCIATED WITH SLEEP: ICD-10-CM

## 2025-07-10 DIAGNOSIS — E66.813 CLASS 3 SEVERE OBESITY DUE TO EXCESS CALORIES WITH SERIOUS COMORBIDITY AND BODY MASS INDEX (BMI) OF 40.0 TO 44.9 IN ADULT: ICD-10-CM

## 2025-07-10 PROCEDURE — G0463 HOSPITAL OUTPT CLINIC VISIT: HCPCS

## 2025-07-10 NOTE — PROGRESS NOTES
"[facility]  Follow Up Sleep Disorders Center Note     Chief Complaint:  ERICH     Primary Care Physician: Prakash Marr APRN    Interval History:   The patient is a 49 y.o. male who I last saw 5/12/2022 and that note was reviewed.  Home sleep study performed 5/27/2022.  Moderate obstructive sleep apnea with sleep-related hypoxia identified and auto CPAP initiated.  This is the patient's first follow-up.    The patient states he is improved.  He goes to bed at midnight gets out of bed at 6:30 AM.    Review of Systems:    A complete review of systems was done and all were negative with the exception of occasional cough    Social History:    Social History     Socioeconomic History    Marital status:    Tobacco Use    Smoking status: Never    Smokeless tobacco: Never   Vaping Use    Vaping status: Never Used   Substance and Sexual Activity    Alcohol use: Never    Drug use: Never    Sexual activity: Yes       Allergies:  Patient has no known allergies.     Medication Review: His list was reviewed.      Vital Signs:    Vitals:    07/10/25 1444   Pulse: 98   SpO2: 94%   Weight: 129 kg (284 lb)   Height: 175.3 cm (69\")     Body mass index is 41.94 kg/m².    Physical Exam:    Constitutional:  Well developed 49 y.o. male that appears in no apparent distress.  Awake & oriented times 3.  Normal mood with normal recent and remote memory and normal judgement.  Eyes:  Conjunctivae normal.  Oropharynx: Previously, moist mucous membranes without exudate and a large tongue and class III Mallampati airway.     Self-administered [place] Sleepiness Scale test results: 3, previously 8  0-5 Lower normal daytime sleepiness  6-10 Higher normal daytime sleepiness  11-12 Mild, 13-15 Moderate, & 16-24 Severe excessive daytime sleepiness     Downloaded PAP Data Evaluated For Therapeutic Response and Compliance:  DME is online/ [employer] and the patient uses a fullface mask.  Downloads between 411 and 7 and 25 compliance " 72%.  Average usage is 6 hours and 24 minutes.  Average AHI is normal without leak.  Average auto CPAP pressure is 10.8 and his ResMed auto CPAP is 8-20    Impression:   Moderate obstructive sleep apnea with sleep-related hypoxia by home sleep study 5/27/2022, weight 308 pounds, adequately treated with ResMed auto CPAP.  Downloads demonstrate the patient not to be at goal with compliance but demonstrates good usage.  The patient has improvement in that he has no complaints of hypersomnolence.    Plan:  Good sleep hygiene measures should be maintained.  Weight loss would be beneficial in this patient who has class III severe obesity by Body mass index is 41.94 kg/m².  When last seen 5/12/2022 weight was 308 pounds and today he weighs 284 pounds.  The patient has been prescribed tirzepatide     After evaluating the patient and assessing results available, the patient is benefiting from the treatment being provided.     The patient will continue ResMed auto CPAP for their ongoing obstructive sleep apnea treatment.  Potential side effects of not using PAP therapy reviewed and addressed as needed.  After clinical evaluation and review of downloads, I recommend no changes to the patient's pressures.  A new prescription will be sent to the patient's DME as needed.    I answered all of the patient's questions.  The patient will call the [facility] for any problems and will follow up for obstructive sleep apnea care in 1 year.      Binh Unger MD  Sleep Medicine  07/10/25  14:48 EDT

## 2025-07-12 PROBLEM — G47.30 HYPERSOMNIA WITH SLEEP APNEA: Status: RESOLVED | Noted: 2022-05-19 | Resolved: 2025-07-12

## 2025-07-12 PROBLEM — G47.33 OSA ON CPAP: Status: ACTIVE | Noted: 2022-05-27

## 2025-07-12 PROBLEM — G47.36 HYPOXEMIA ASSOCIATED WITH SLEEP: Status: ACTIVE | Noted: 2025-07-12

## 2025-07-12 PROBLEM — G47.10 HYPERSOMNIA WITH SLEEP APNEA: Status: RESOLVED | Noted: 2022-05-19 | Resolved: 2025-07-12
